# Patient Record
Sex: MALE | Race: WHITE | NOT HISPANIC OR LATINO | Employment: OTHER | URBAN - METROPOLITAN AREA
[De-identification: names, ages, dates, MRNs, and addresses within clinical notes are randomized per-mention and may not be internally consistent; named-entity substitution may affect disease eponyms.]

---

## 2021-10-18 LAB
SARS-COV-2, PCR: NOT DETECTED
SPECIMEN SOURCE: NORMAL

## 2023-01-18 PROBLEM — I35.0 AORTIC STENOSIS: Status: ACTIVE | Noted: 2023-01-18

## 2023-01-18 PROBLEM — M62.89 EXERCISE-INDUCED LEG FATIGUE: Status: ACTIVE | Noted: 2023-01-18

## 2023-01-18 PROBLEM — I65.23 STENOSIS OF BOTH EXTERNAL CAROTID ARTERIES: Status: ACTIVE | Noted: 2023-01-18

## 2023-01-18 PROBLEM — N40.0 BENIGN ENLARGEMENT OF PROSTATE: Status: ACTIVE | Noted: 2023-01-18

## 2023-01-18 PROBLEM — I10 HYPERTENSION: Status: ACTIVE | Noted: 2023-01-18

## 2023-01-18 PROBLEM — E78.5 HYPERLIPIDEMIA: Status: ACTIVE | Noted: 2023-01-18

## 2023-01-18 PROBLEM — N18.30 CHRONIC RENAL DISEASE, STAGE III (MULTI): Status: ACTIVE | Noted: 2023-01-18

## 2023-01-18 PROBLEM — M51.369 DDD (DEGENERATIVE DISC DISEASE), LUMBAR: Status: ACTIVE | Noted: 2023-01-18

## 2023-01-18 PROBLEM — M51.36 DDD (DEGENERATIVE DISC DISEASE), LUMBAR: Status: ACTIVE | Noted: 2023-01-18

## 2023-01-18 PROBLEM — R94.39 ABNORMAL NUCLEAR STRESS TEST: Status: ACTIVE | Noted: 2023-01-18

## 2023-01-18 PROBLEM — I25.10 CORONARY ARTERY DISEASE: Status: ACTIVE | Noted: 2023-01-18

## 2023-01-18 PROBLEM — M48.062 SPINAL STENOSIS, LUMBAR REGION, WITH NEUROGENIC CLAUDICATION: Status: ACTIVE | Noted: 2023-01-18

## 2023-01-18 PROBLEM — J30.9 ALLERGIC RHINITIS: Status: ACTIVE | Noted: 2023-01-18

## 2023-01-18 PROBLEM — F17.200 NICOTINE DEPENDENCE: Status: ACTIVE | Noted: 2023-01-18

## 2023-01-18 PROBLEM — M54.16 LUMBAR RADICULOPATHY: Status: ACTIVE | Noted: 2023-01-18

## 2023-01-18 PROBLEM — M47.816 DEGENERATIVE JOINT DISEASE (DJD) OF LUMBAR SPINE: Status: ACTIVE | Noted: 2023-01-18

## 2023-01-18 PROBLEM — Z95.1 S/P CABG (CORONARY ARTERY BYPASS GRAFT): Status: ACTIVE | Noted: 2023-01-18

## 2023-01-18 PROBLEM — R93.1 ABNORMAL FINDINGS ON CARDIAC CATHETERIZATION: Status: ACTIVE | Noted: 2023-01-18

## 2023-01-18 PROBLEM — F41.1 GENERALIZED ANXIETY DISORDER: Status: ACTIVE | Noted: 2023-01-18

## 2023-01-18 PROBLEM — K64.9 HEMORRHOIDS: Status: ACTIVE | Noted: 2023-01-18

## 2023-01-18 PROBLEM — R94.31 ECG ABNORMAL: Status: ACTIVE | Noted: 2023-01-18

## 2023-01-18 PROBLEM — M62.50 DENERVATION ATROPHY OF MUSCLE: Status: ACTIVE | Noted: 2023-01-18

## 2023-01-18 PROBLEM — F32.A DEPRESSION, UNSPECIFIED: Status: ACTIVE | Noted: 2023-01-18

## 2023-01-18 PROBLEM — R09.89 DECREASED PEDAL PULSES: Status: ACTIVE | Noted: 2023-01-18

## 2023-01-18 PROBLEM — R01.1 CARDIAC MURMUR: Status: ACTIVE | Noted: 2023-01-18

## 2023-01-18 PROBLEM — E55.9 VITAMIN D DEFICIENCY: Status: ACTIVE | Noted: 2023-01-18

## 2023-01-18 RX ORDER — MULTIVITAMIN
TABLET ORAL
COMMUNITY

## 2023-01-18 RX ORDER — TERAZOSIN 1 MG/1
1 CAPSULE ORAL DAILY
COMMUNITY
End: 2023-09-26 | Stop reason: SDUPTHER

## 2023-01-18 RX ORDER — ATORVASTATIN CALCIUM 40 MG/1
40 TABLET, FILM COATED ORAL DAILY
COMMUNITY
End: 2023-12-28 | Stop reason: SDUPTHER

## 2023-01-18 RX ORDER — EPINEPHRINE 0.3 MG/.3ML
1 INJECTION SUBCUTANEOUS AS NEEDED
COMMUNITY

## 2023-01-18 RX ORDER — TRAMADOL HYDROCHLORIDE 50 MG/1
50 TABLET ORAL EVERY 6 HOURS PRN
COMMUNITY
End: 2023-08-07 | Stop reason: SDUPTHER

## 2023-01-18 RX ORDER — ASPIRIN 81 MG/1
81 TABLET ORAL DAILY
COMMUNITY

## 2023-01-18 RX ORDER — FLUOXETINE HYDROCHLORIDE 20 MG/1
20 CAPSULE ORAL DAILY
COMMUNITY
End: 2023-12-28 | Stop reason: SDUPTHER

## 2023-01-18 RX ORDER — METOPROLOL TARTRATE 25 MG/1
25 TABLET, FILM COATED ORAL 2 TIMES DAILY
COMMUNITY
End: 2024-05-16 | Stop reason: SDUPTHER

## 2023-01-18 RX ORDER — MELOXICAM 7.5 MG/1
7.5 TABLET ORAL DAILY PRN
COMMUNITY
End: 2023-01-18 | Stop reason: ENTERED-IN-ERROR

## 2023-01-18 RX ORDER — CLOPIDOGREL BISULFATE 75 MG/1
75 TABLET ORAL DAILY
COMMUNITY
End: 2024-04-12 | Stop reason: SDUPTHER

## 2023-01-18 RX ORDER — MUPIROCIN 20 MG/G
OINTMENT TOPICAL
COMMUNITY
End: 2023-12-28 | Stop reason: WASHOUT

## 2023-03-03 PROBLEM — F32.A DEPRESSION: Status: ACTIVE | Noted: 2023-03-03

## 2023-07-31 ENCOUNTER — TELEPHONE (OUTPATIENT)
Dept: PRIMARY CARE | Facility: CLINIC | Age: 83
End: 2023-07-31
Payer: MEDICARE

## 2023-07-31 NOTE — TELEPHONE ENCOUNTER
Refill    Tramadol (ultram) 50 mg tablet, take 1 tablet every 6 hours prn pain    Pharm: [facility]   [phone]    LR: 12/27/22 30 days 2 refills  LV:  12/27/22  NV: 12/28/23

## 2023-08-01 ENCOUNTER — TELEPHONE (OUTPATIENT)
Dept: PRIMARY CARE | Facility: CLINIC | Age: 83
End: 2023-08-01
Payer: MEDICARE

## 2023-08-01 NOTE — TELEPHONE ENCOUNTER
REFILL REQUEST    Med: Tramadol   Med Dose: 50 mg  Med Frequency: one tab every 6 hours as needed    Pharmacy: [facility]  Pharmacy Address: [address] [place] [address] in [facility]     LR: 12/27/2022  LV: no showed C2 on 3/6/23  NV: 12/28/2023

## 2023-08-07 ENCOUNTER — OFFICE VISIT (OUTPATIENT)
Dept: PRIMARY CARE | Facility: CLINIC | Age: 83
End: 2023-08-07
Payer: MEDICARE

## 2023-08-07 VITALS
WEIGHT: 154 LBS | HEIGHT: 70 IN | SYSTOLIC BLOOD PRESSURE: 208 MMHG | BODY MASS INDEX: 22.05 KG/M2 | DIASTOLIC BLOOD PRESSURE: 91 MMHG

## 2023-08-07 DIAGNOSIS — M47.26 OSTEOARTHRITIS OF SPINE WITH RADICULOPATHY, LUMBAR REGION: Primary | ICD-10-CM

## 2023-08-07 DIAGNOSIS — Z51.81 ENCOUNTER FOR THERAPEUTIC DRUG LEVEL MONITORING: ICD-10-CM

## 2023-08-07 DIAGNOSIS — Z02.83 ENCOUNTER FOR DRUG SCREENING: ICD-10-CM

## 2023-08-07 PROCEDURE — 80365 DRUG SCREENING OXYCODONE: CPT

## 2023-08-07 PROCEDURE — 80307 DRUG TEST PRSMV CHEM ANLYZR: CPT

## 2023-08-07 PROCEDURE — 80361 OPIATES 1 OR MORE: CPT

## 2023-08-07 PROCEDURE — 3080F DIAST BP >= 90 MM HG: CPT | Performed by: FAMILY MEDICINE

## 2023-08-07 PROCEDURE — 80354 DRUG SCREENING FENTANYL: CPT

## 2023-08-07 PROCEDURE — 80373 DRUG SCREENING TRAMADOL: CPT

## 2023-08-07 PROCEDURE — 1159F MED LIST DOCD IN RCRD: CPT | Performed by: FAMILY MEDICINE

## 2023-08-07 PROCEDURE — 80358 DRUG SCREENING METHADONE: CPT

## 2023-08-07 PROCEDURE — 3077F SYST BP >= 140 MM HG: CPT | Performed by: FAMILY MEDICINE

## 2023-08-07 PROCEDURE — 80368 SEDATIVE HYPNOTICS: CPT

## 2023-08-07 PROCEDURE — 1125F AMNT PAIN NOTED PAIN PRSNT: CPT | Performed by: FAMILY MEDICINE

## 2023-08-07 PROCEDURE — 80346 BENZODIAZEPINES1-12: CPT

## 2023-08-07 PROCEDURE — 1160F RVW MEDS BY RX/DR IN RCRD: CPT | Performed by: FAMILY MEDICINE

## 2023-08-07 PROCEDURE — 99213 OFFICE O/P EST LOW 20 MIN: CPT | Performed by: FAMILY MEDICINE

## 2023-08-07 RX ORDER — TRAMADOL HYDROCHLORIDE 50 MG/1
50 TABLET ORAL EVERY 6 HOURS PRN
Qty: 60 TABLET | Refills: 2 | Status: SHIPPED | OUTPATIENT
Start: 2023-08-07 | End: 2023-12-28 | Stop reason: SDUPTHER

## 2023-08-07 NOTE — PROGRESS NOTES
"Subjective     Patient ID: [id number]     Honorio Harvey is a 82 y.o. male who presents for C2 Refill.    HPI  Patient is using 1-2 Ultram daily for chronic low back pain.  Occasionally substitutes or adds aspirin for pain.  He remains functional and lives independently with his wife.  His last dose of tramadol was yesterday.    Objective   BP (!) 208/91   Ht 1.778 m (5' 10\")   Wt 69.9 kg (154 lb)   BMI 22.10 kg/m²    Physical Exam: EXAM:  Alert and oriented ×3.  No acute distress.  No tremors noted.  Gait is normal.  Mood and affect are normal.     Assessment/Plan   1. Osteoarthritis of spine with radiculopathy, lumbar region  - traMADol (Ultram) 50 mg tablet; Take 1 tablet (50 mg) by mouth every 6 hours if needed for severe pain (7 - 10) (As Needed for Pain).  Dispense: 60 tablet; Refill: 2    2. Encounter for drug screening  - Opiate/Opioid/Benzo Extended Prescription Compliance    3. Encounter for therapeutic drug level monitoring  - Opiate/Opioid/Benzo Extended Prescription Compliance      Follow up in 5 months as scheduled for medical management    I will continue to monitor, evaluate, assess and treat all problems/diagnoses as appropriate and continue to collaborate with specialists.    Contact office or send a  MY Chart message with any questions or concerns    Encouraged to sign up with my  My Chart  Patient will only be notified of labs that require medical intervention.    Prescriptions will not be filled unless you are compliant with your follow up appointments or have a follow up appointment scheduled as per instruction of your physician. Refills should be requested at the time of your visit.    **Charting was completed using voice recognition technology and may include unintended errors**    Bassem Amador DO, [facility]  [address] [facility]  [place] [address] [phone]    Problem List Items Addressed This Visit    None  Visit Diagnoses       Encounter for drug screening        Relevant Orders "    Opiate/Opioid/Benzo Extended Prescription Compliance    Encounter for therapeutic drug level monitoring        Relevant Orders    Opiate/Opioid/Benzo Extended Prescription Compliance            Bassem Amador, DO

## 2023-08-10 LAB
6-ACETYLMORPHINE: <25 NG/ML
7-AMINOCLONAZEPAM: <25 NG/ML
ALPHA-HYDROXYALPRAZOLAM: <25 NG/ML
ALPHA-HYDROXYMIDAZOLAM: <25 NG/ML
ALPRAZOLAM: <25 NG/ML
AMPHETAMINE (PRESENCE) IN URINE BY SCREEN METHOD: ABNORMAL
BARBITURATES PRESENCE IN URINE BY SCREEN METHOD: ABNORMAL
CANNABINOIDS IN URINE BY SCREEN METHOD: ABNORMAL
CHLORDIAZEPOXIDE: <25 NG/ML
CLONAZEPAM: <25 NG/ML
COCAINE (PRESENCE) IN URINE BY SCREEN METHOD: ABNORMAL
CODEINE: <50 NG/ML
CREATINE, URINE FOR DRUG: 226.6 MG/DL
DIAZEPAM: <25 NG/ML
DRUG SCREEN COMMENT URINE: ABNORMAL
EDDP: <25 NG/ML
FENTANYL CONFIRMATION, URINE: <2.5 NG/ML
HYDROCODONE: <25 NG/ML
HYDROMORPHONE: <25 NG/ML
LORAZEPAM: <25 NG/ML
METHADONE CONFIRMATION,URINE: <25 NG/ML
MIDAZOLAM: <25 NG/ML
MORPHINE URINE: <50 NG/ML
NORDIAZEPAM: <25 NG/ML
NORFENTANYL: <2.5 NG/ML
NORHYDROCODONE: <25 NG/ML
NOROXYCODONE: <25 NG/ML
O-DESMETHYLTRAMADOL: 375 NG/ML
OXAZEPAM: <25 NG/ML
OXYCODONE: <25 NG/ML
OXYMORPHONE: <25 NG/ML
PHENCYCLIDINE (PRESENCE) IN URINE BY SCREEN METHOD: ABNORMAL
TEMAZEPAM: <25 NG/ML
TRAMADOL: >1000 NG/ML
ZOLPIDEM METABOLITE (ZCA): <25 NG/ML
ZOLPIDEM: <25 NG/ML

## 2023-09-07 PROBLEM — Z63.6 CAREGIVER STRESS: Status: ACTIVE | Noted: 2023-09-07

## 2023-09-07 PROBLEM — R89.9 ABNORMAL LABORATORY TEST RESULT: Status: ACTIVE | Noted: 2022-03-22

## 2023-09-07 PROBLEM — I73.9 CLAUDICATION OF LOWER EXTREMITY (CMS-HCC): Status: ACTIVE | Noted: 2023-09-07

## 2023-09-07 PROBLEM — Z79.899 LONG TERM USE OF DRUG: Status: ACTIVE | Noted: 2023-09-07

## 2023-09-07 RX ORDER — LOSARTAN POTASSIUM 100 MG/1
100 TABLET ORAL DAILY
COMMUNITY
Start: 2019-09-09 | End: 2023-12-28 | Stop reason: WASHOUT

## 2023-09-07 RX ORDER — HYDROCHLOROTHIAZIDE 25 MG/1
25 TABLET ORAL DAILY
COMMUNITY

## 2023-09-07 RX ORDER — DAPAGLIFLOZIN 10 MG/1
1 TABLET, FILM COATED ORAL DAILY
COMMUNITY
Start: 2023-01-18 | End: 2024-05-16 | Stop reason: SDUPTHER

## 2023-09-07 RX ORDER — DEXAMETHASONE SODIUM PHOSPHATE 10 MG/ML
10 INJECTION INTRAMUSCULAR; INTRAVENOUS
COMMUNITY
Start: 2020-12-03 | End: 2023-12-28 | Stop reason: WASHOUT

## 2023-09-07 RX ORDER — LOSARTAN POTASSIUM 100 MG/1
0.5 TABLET ORAL 2 TIMES DAILY
COMMUNITY
Start: 2018-05-31 | End: 2024-05-16 | Stop reason: SDUPTHER

## 2023-09-07 RX ORDER — ACETAMINOPHEN 325 MG/1
2 TABLET ORAL EVERY 4 HOURS PRN
COMMUNITY
Start: 2021-12-07

## 2023-09-07 RX ORDER — MELOXICAM 7.5 MG/1
1 TABLET ORAL AS NEEDED
COMMUNITY
Start: 2022-03-08 | End: 2023-12-28 | Stop reason: WASHOUT

## 2023-09-07 RX ORDER — OXYCODONE HYDROCHLORIDE 5 MG/1
1 TABLET ORAL EVERY 6 HOURS PRN
COMMUNITY
Start: 2022-04-07 | End: 2023-12-28 | Stop reason: WASHOUT

## 2023-09-07 RX ORDER — OXYCODONE AND ACETAMINOPHEN 5; 325 MG/1; MG/1
1 TABLET ORAL 3 TIMES DAILY PRN
COMMUNITY
Start: 2022-09-09 | End: 2023-12-28 | Stop reason: WASHOUT

## 2023-09-07 RX ORDER — GABAPENTIN 300 MG/1
1 CAPSULE ORAL 2 TIMES DAILY
COMMUNITY
Start: 2019-09-20 | End: 2023-12-28 | Stop reason: WASHOUT

## 2023-09-07 RX ORDER — ATENOLOL 25 MG/1
25 TABLET ORAL DAILY
COMMUNITY
Start: 2019-09-15 | End: 2024-05-16 | Stop reason: ALTCHOICE

## 2023-09-16 DIAGNOSIS — I10 ESSENTIAL (PRIMARY) HYPERTENSION: ICD-10-CM

## 2023-09-18 RX ORDER — TERAZOSIN 1 MG/1
1 CAPSULE ORAL DAILY
Qty: 90 CAPSULE | Refills: 2 | OUTPATIENT
Start: 2023-09-18

## 2023-09-23 DIAGNOSIS — I10 ESSENTIAL (PRIMARY) HYPERTENSION: ICD-10-CM

## 2023-09-25 ENCOUNTER — TELEPHONE (OUTPATIENT)
Dept: PRIMARY CARE | Facility: CLINIC | Age: 83
End: 2023-09-25
Payer: MEDICARE

## 2023-09-25 DIAGNOSIS — I10 HYPERTENSION, UNSPECIFIED TYPE: ICD-10-CM

## 2023-09-25 RX ORDER — TERAZOSIN 1 MG/1
1 CAPSULE ORAL DAILY
Qty: 90 CAPSULE | Refills: 2 | OUTPATIENT
Start: 2023-09-25

## 2023-09-25 NOTE — TELEPHONE ENCOUNTER
REFILL REQUEST    Med: Terazosin   Med Dose: 1 mg  Med Frequency: one cap daily    Pharmacy:   Pharmacy Address: [address] [place] [address] in [facility]    LR: 12/27/2023  LV: 08/07/2023  NV: 12/28/2023

## 2023-09-26 DIAGNOSIS — I10 ESSENTIAL (PRIMARY) HYPERTENSION: ICD-10-CM

## 2023-09-26 RX ORDER — TERAZOSIN 1 MG/1
1 CAPSULE ORAL DAILY
Qty: 30 CAPSULE | Refills: 0 | Status: SHIPPED | OUTPATIENT
Start: 2023-09-26 | End: 2023-11-06 | Stop reason: SDUPTHER

## 2023-09-28 RX ORDER — TERAZOSIN 1 MG/1
1 CAPSULE ORAL DAILY
Qty: 90 CAPSULE | Refills: 2 | OUTPATIENT
Start: 2023-09-28

## 2023-10-09 ENCOUNTER — APPOINTMENT (OUTPATIENT)
Dept: RADIOLOGY | Facility: HOSPITAL | Age: 83
End: 2023-10-09
Payer: MEDICARE

## 2023-10-11 ENCOUNTER — TELEPHONE (OUTPATIENT)
Dept: VASCULAR SURGERY | Facility: CLINIC | Age: 83
End: 2023-10-11
Payer: MEDICARE

## 2023-10-11 NOTE — TELEPHONE ENCOUNTER
I have attempted to contact   oKry Honorio Harvey    . There is no answer at the following phone number [phone]  .   I am unable to leave a voice mail message  Nereyda Nichlos RN BSN

## 2023-10-12 ENCOUNTER — APPOINTMENT (OUTPATIENT)
Dept: VASCULAR SURGERY | Facility: CLINIC | Age: 83
End: 2023-10-12
Payer: MEDICARE

## 2023-10-17 ENCOUNTER — HOSPITAL ENCOUNTER (OUTPATIENT)
Dept: RADIOLOGY | Facility: HOSPITAL | Age: 83
Discharge: HOME | End: 2023-10-17
Payer: MEDICARE

## 2023-10-17 DIAGNOSIS — I70.211 ATHEROSCLEROSIS OF NATIVE ARTERIES OF EXTREMITIES WITH INTERMITTENT CLAUDICATION, RIGHT LEG (CMS-HCC): ICD-10-CM

## 2023-10-17 DIAGNOSIS — I70.212 ATHEROSCLEROSIS OF NATIVE ARTERIES OF EXTREMITIES WITH INTERMITTENT CLAUDICATION, LEFT LEG (CMS-HCC): ICD-10-CM

## 2023-10-17 DIAGNOSIS — I73.9 PERIPHERAL VASCULAR DISEASE, UNSPECIFIED (CMS-HCC): ICD-10-CM

## 2023-10-17 PROCEDURE — 93923 UPR/LXTR ART STDY 3+ LVLS: CPT | Performed by: INTERNAL MEDICINE

## 2023-10-17 PROCEDURE — 93923 UPR/LXTR ART STDY 3+ LVLS: CPT

## 2023-10-25 ENCOUNTER — TELEPHONE (OUTPATIENT)
Dept: VASCULAR SURGERY | Facility: CLINIC | Age: 83
End: 2023-10-25
Payer: MEDICARE

## 2023-10-25 NOTE — TELEPHONE ENCOUNTER
I have attempted to contact    Mr. Harvey  . There is no answer at the following phone number   [phone]   . I have left a voice mail message for the patient to contact Dr. Bass's office nurse at [phone] .   I am trying to confirm the patient has completed AUGUSTINE studies  PRIOR to his planned office visit.   Nereyda Nichols RN BSN

## 2023-11-02 ENCOUNTER — OFFICE VISIT (OUTPATIENT)
Dept: VASCULAR SURGERY | Facility: CLINIC | Age: 83
End: 2023-11-02
Payer: MEDICARE

## 2023-11-02 ENCOUNTER — TRANSCRIBE ORDERS (OUTPATIENT)
Dept: VASCULAR SURGERY | Facility: CLINIC | Age: 83
End: 2023-11-02

## 2023-11-02 VITALS
OXYGEN SATURATION: 99 % | DIASTOLIC BLOOD PRESSURE: 74 MMHG | RESPIRATION RATE: 16 BRPM | HEART RATE: 64 BPM | WEIGHT: 159 LBS | HEIGHT: 70 IN | TEMPERATURE: 96.9 F | BODY MASS INDEX: 22.76 KG/M2 | SYSTOLIC BLOOD PRESSURE: 119 MMHG

## 2023-11-02 DIAGNOSIS — I73.9 PAD (PERIPHERAL ARTERY DISEASE) (CMS-HCC): Primary | ICD-10-CM

## 2023-11-02 DIAGNOSIS — I73.9 PAD (PERIPHERAL ARTERY DISEASE) (CMS-HCC): ICD-10-CM

## 2023-11-02 PROCEDURE — 3074F SYST BP LT 130 MM HG: CPT | Performed by: SURGERY

## 2023-11-02 PROCEDURE — 99214 OFFICE O/P EST MOD 30 MIN: CPT | Performed by: SURGERY

## 2023-11-02 PROCEDURE — 3078F DIAST BP <80 MM HG: CPT | Performed by: SURGERY

## 2023-11-02 PROCEDURE — 1160F RVW MEDS BY RX/DR IN RCRD: CPT | Performed by: SURGERY

## 2023-11-02 PROCEDURE — 1125F AMNT PAIN NOTED PAIN PRSNT: CPT | Performed by: SURGERY

## 2023-11-02 PROCEDURE — 1159F MED LIST DOCD IN RCRD: CPT | Performed by: SURGERY

## 2023-11-02 NOTE — PROGRESS NOTES
Vascular Surgery Clinic Note    CC: PAD    HPI:  Honorio Harvey is 83 y.o. male with history of PAD and claudication. Still smoking. Claudication improved with home treadmill exercises. He feels well. He is getting ready for pheasant hunting this fall with a new hunting dog. No rest pain.     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    Meds:   Current Outpatient Medications on File Prior to Visit   Medication Sig Dispense Refill    acetaminophen (Tylenol) 325 mg tablet Take 2 tablets (650 mg) by mouth every 4 hours if needed for mild pain (1 - 3) or fever (temp greater than 38.0 C).      aspirin 81 mg EC tablet Take 1 tablet (81 mg) by mouth once daily. As Directed      atenolol (Tenormin) 25 mg tablet Take 1 tablet (25 mg) by mouth once daily.      atorvastatin (Lipitor) 40 mg tablet Take 1 tablet (40 mg) by mouth once daily. As Directed      clopidogrel (Plavix) 75 mg tablet Take 1 tablet (75 mg) by mouth once daily.      dapagliflozin propanediol (Farxiga) 10 mg Take 1 tablet (10 mg) by mouth once daily.      dexAMETHasone, PF, 10 mg/mL injection 1 mL (10 mg).      EPINEPHrine 0.3 mg/0.3 mL injection syringe Inject 0.3 mL (0.3 mg) as directed if needed for anaphylaxis (Inject 0.3ml Intramusculary as Directed). Inject into upper leg. Call 911 after  use.      FLUoxetine (PROzac) 20 mg capsule Take 1 capsule (20 mg) by mouth once daily.      gabapentin (Neurontin) 300 mg capsule Take 1 capsule (300 mg) by mouth 2 times a day.      hydroCHLOROthiazide (HYDRODiuril) 25 mg tablet Take 1 tablet (25 mg) by mouth once daily.      lidocaine HCL 4 % adhesive patch,medicated Apply 1-2 patches topically 1 (one) time each day. Apply 1-2 patches to lower back and leave in place for 12 hours then remove for 12 hours      losartan (Cozaar) 100 mg tablet twice a day.  Take by mouth twice daily. 1/2 tab 2 times a day      losartan (Cozaar) 100 mg tablet Take 1 tablet (100 mg) by mouth once daily.      meloxicam (Mobic) 7.5 mg tablet Take 1 tablet (7.5 mg) by mouth if needed (pain).      metoprolol tartrate (Lopressor) 25 mg tablet Take 1 tablet (25 mg) by mouth 2 times a day.      multivitamin tablet Take by mouth.      mupirocin (Bactroban) 2 % ointment Apply topically. Apply to the Affected Area(s) twice daily starting 5 (FIVE) days Prior to surgery      oxyCODONE (Roxicodone) 5 mg immediate release tablet Take 1 tablet (5 mg) by mouth every 6 hours if needed (pain).      oxyCODONE-acetaminophen (Percocet) 5-325 mg tablet Take 1 tablet by mouth 3 times a day as needed (pain).      traMADol (Ultram) 50 mg tablet Take 1 tablet (50 mg) by mouth every 6 hours if needed for severe pain (7 - 10) (As Needed for Pain). 60 tablet 2    terazosin (Hytrin) 1 mg capsule Take 1 capsule (1 mg) by mouth once daily. 30 capsule 0     No current facility-administered medications on file prior to visit.        Allergies:   Allergies   Allergen Reactions    Bee Venom Protein (Honey Bee) Unknown       SH:    Social Determinants of Health     Tobacco Use: High Risk (11/2/2023)    Patient History     Smoking Tobacco Use: Every Day     Smokeless Tobacco Use: Never     Passive Exposure: Not on file   Alcohol Use: Not on file   Financial Resource Strain: Not on file   Food Insecurity: Not on file    Transportation Needs: Not on file   Physical Activity: Not on file   Stress: Not on file   Social Connections: Not on file   Intimate Partner Violence: Not on file   Depression: Not on file   Housing Stability: Not on file   Utilities: Not on file   Digital Equity: Not on file        FH:  Family History   Problem Relation Name Age of Onset    Other (malignant neoplasm) Mother      Other (cerebral infarction) Father      Emphysema Father      Hypertension Father          ROS:  All systems were reviewed and are negative except as per HPI.    Objective:  Vitals:  Vitals:    11/02/23 1107   BP: 119/74   Pulse: 64   Resp: 16   Temp: 36.1 °C (96.9 °F)   SpO2: 99%        Exam:  In NAD, well appearing  Abd Soft, ND/NT  Vascular examination:  Feet are warm, no wounds.    Assessment & Plan:  Honorio Harvey is 83 y.o. male with stable claudication. Rtc yearly with AUGUSTINE.      I spent a total of 30 minutes on the day of the visit.         Jeffery Bass M.D.

## 2023-11-06 ENCOUNTER — TELEPHONE (OUTPATIENT)
Dept: PRIMARY CARE | Facility: CLINIC | Age: 83
End: 2023-11-06
Payer: MEDICARE

## 2023-11-06 DIAGNOSIS — I10 HYPERTENSION, UNSPECIFIED TYPE: ICD-10-CM

## 2023-11-06 RX ORDER — TERAZOSIN 1 MG/1
1 CAPSULE ORAL DAILY
Qty: 90 CAPSULE | Refills: 0 | Status: SHIPPED | OUTPATIENT
Start: 2023-11-06 | End: 2023-12-28 | Stop reason: SDUPTHER

## 2023-11-06 NOTE — TELEPHONE ENCOUNTER
REFILL REQUEST    Med: Terazosin   Med Dose: 1 mg  Med Frequency: one cap daily    Pharmacy:   Pharmacy Address: [address] [place] [address] in [facility]    LR: 09/26/2023  LV: 08/07/2023  NV: 12/28/2023

## 2023-12-28 ENCOUNTER — OFFICE VISIT (OUTPATIENT)
Dept: PRIMARY CARE | Facility: CLINIC | Age: 83
End: 2023-12-28
Payer: MEDICARE

## 2023-12-28 VITALS
OXYGEN SATURATION: 96 % | WEIGHT: 151 LBS | BODY MASS INDEX: 22.36 KG/M2 | DIASTOLIC BLOOD PRESSURE: 74 MMHG | SYSTOLIC BLOOD PRESSURE: 110 MMHG | HEIGHT: 69 IN | HEART RATE: 62 BPM

## 2023-12-28 DIAGNOSIS — Z00.00 MEDICARE ANNUAL WELLNESS VISIT, SUBSEQUENT: ICD-10-CM

## 2023-12-28 DIAGNOSIS — Z00.00 ROUTINE GENERAL MEDICAL EXAMINATION AT HEALTH CARE FACILITY: Primary | ICD-10-CM

## 2023-12-28 DIAGNOSIS — E55.9 VITAMIN D DEFICIENCY: ICD-10-CM

## 2023-12-28 DIAGNOSIS — M47.26 OSTEOARTHRITIS OF SPINE WITH RADICULOPATHY, LUMBAR REGION: ICD-10-CM

## 2023-12-28 DIAGNOSIS — I10 HYPERTENSION, UNSPECIFIED TYPE: ICD-10-CM

## 2023-12-28 DIAGNOSIS — S60.222A CONTUSION OF LEFT HAND, INITIAL ENCOUNTER: ICD-10-CM

## 2023-12-28 DIAGNOSIS — F41.9 ANXIETY: ICD-10-CM

## 2023-12-28 DIAGNOSIS — E78.5 HYPERLIPIDEMIA, UNSPECIFIED HYPERLIPIDEMIA TYPE: ICD-10-CM

## 2023-12-28 DIAGNOSIS — N40.0 BENIGN PROSTATIC HYPERPLASIA, UNSPECIFIED WHETHER LOWER URINARY TRACT SYMPTOMS PRESENT: ICD-10-CM

## 2023-12-28 DIAGNOSIS — E29.1 HYPOGONADISM MALE: ICD-10-CM

## 2023-12-28 DIAGNOSIS — S61.215A LACERATION OF LEFT RING FINGER WITHOUT FOREIGN BODY WITHOUT DAMAGE TO NAIL, INITIAL ENCOUNTER: ICD-10-CM

## 2023-12-28 PROCEDURE — 3074F SYST BP LT 130 MM HG: CPT | Performed by: FAMILY MEDICINE

## 2023-12-28 PROCEDURE — 1170F FXNL STATUS ASSESSED: CPT | Performed by: FAMILY MEDICINE

## 2023-12-28 PROCEDURE — 1125F AMNT PAIN NOTED PAIN PRSNT: CPT | Performed by: FAMILY MEDICINE

## 2023-12-28 PROCEDURE — 1160F RVW MEDS BY RX/DR IN RCRD: CPT | Performed by: FAMILY MEDICINE

## 2023-12-28 PROCEDURE — 1159F MED LIST DOCD IN RCRD: CPT | Performed by: FAMILY MEDICINE

## 2023-12-28 PROCEDURE — 99213 OFFICE O/P EST LOW 20 MIN: CPT | Performed by: FAMILY MEDICINE

## 2023-12-28 PROCEDURE — 3078F DIAST BP <80 MM HG: CPT | Performed by: FAMILY MEDICINE

## 2023-12-28 PROCEDURE — 99397 PER PM REEVAL EST PAT 65+ YR: CPT | Performed by: FAMILY MEDICINE

## 2023-12-28 RX ORDER — FLUOXETINE HYDROCHLORIDE 20 MG/1
20 CAPSULE ORAL DAILY
Qty: 90 CAPSULE | Refills: 2 | Status: SHIPPED | OUTPATIENT
Start: 2023-12-28 | End: 2024-05-16 | Stop reason: SDUPTHER

## 2023-12-28 RX ORDER — ATORVASTATIN CALCIUM 40 MG/1
40 TABLET, FILM COATED ORAL DAILY
Qty: 90 TABLET | Refills: 2 | Status: SHIPPED | OUTPATIENT
Start: 2023-12-28 | End: 2024-05-16 | Stop reason: SDUPTHER

## 2023-12-28 RX ORDER — TRAMADOL HYDROCHLORIDE 50 MG/1
50 TABLET ORAL EVERY 6 HOURS PRN
Qty: 60 TABLET | Refills: 2 | Status: SHIPPED | OUTPATIENT
Start: 2023-12-28 | End: 2024-05-16 | Stop reason: SDUPTHER

## 2023-12-28 RX ORDER — TERAZOSIN 1 MG/1
1 CAPSULE ORAL DAILY
Qty: 90 CAPSULE | Refills: 0 | Status: SHIPPED | OUTPATIENT
Start: 2023-12-28 | End: 2024-04-12 | Stop reason: SDUPTHER

## 2023-12-28 ASSESSMENT — ENCOUNTER SYMPTOMS
OCCASIONAL FEELINGS OF UNSTEADINESS: 0
LOSS OF SENSATION IN FEET: 0
DEPRESSION: 0

## 2023-12-28 ASSESSMENT — ACTIVITIES OF DAILY LIVING (ADL)
DRESSING: INDEPENDENT
TAKING_MEDICATION: INDEPENDENT
BATHING: INDEPENDENT
GROCERY_SHOPPING: INDEPENDENT
DOING_HOUSEWORK: INDEPENDENT
MANAGING_FINANCES: INDEPENDENT

## 2023-12-28 ASSESSMENT — PATIENT HEALTH QUESTIONNAIRE - PHQ9
2. FEELING DOWN, DEPRESSED OR HOPELESS: NOT AT ALL
1. LITTLE INTEREST OR PLEASURE IN DOING THINGS: NOT AT ALL
SUM OF ALL RESPONSES TO PHQ9 QUESTIONS 1 & 2: 0

## 2023-12-28 NOTE — PROGRESS NOTES
Annual Comprehensive Medical Exam    83 y.o. male presents for annual comprehensive medical evaluation and preventive services screening.  No recent hospitalizations, surgeries or significant injuries.    HPI    12/24/23 walking dog, pulled over by dog.  Sustained left 4th finger gouge and contusion of hand.  Did not go to ER.  Can move everything.  No significant pain, but wound is significant.  Tx with antibiotic cream to wound;  Voltaren gel to hand.    DJD spine - usually takes 1-2 ultram per day.  Last filled 11/7/23 for 60 pills.  Last dose this AM.  Washing dishes or running vacuum worsen lower back pain    Peripheral artery disease with claudication symptoms in the left leg.  Treatment by vascular surgeon, Dr. Bass.  AUGUSTINE done twice in 2023.  Treatment has been progressive exercise endurance.  Patient states he is now able to walk longer distances.  The patient loves to hunt and wants to be able to walk longer distances.    Coronary artery disease managed by Dr. Subramanian, cardiologist.  Using Farxiga both for CAD and CKD stage IIIa.    Medication list reviewed with patient.  Multiple medications are no longer being taken are taken differently.    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 condition    Occlusion and stenosis of bilateral carotid arteries 09/26/2016    Asymptomatic bilateral carotid artery stenosis    Occlusion and stenosis of left carotid artery 10/19/2020    Occlusion of carotid artery without cerebral infarction, left    Other conditions influencing health status 10/03/2016    Normal cardiac stress test    Other general  symptoms and signs 12/23/2016    Excessive oral secretions    Parageusia 04/19/2020    Ageusia    Personal history of other mental and behavioral disorders 09/11/2019    History of anxiety    Personal history of other specified conditions 12/23/2016    History of dysphagia    Unspecified macular degeneration 10/03/2016    Macular degeneration      Past Surgical History:   Procedure Laterality Date    CAROTID ENDARTERECTOMY  04/19/2020    Carotid Thromboendarterectomy    OTHER SURGICAL HISTORY  12/27/2022    Lumbar laminectomy    TONSILLECTOMY  10/03/2016    Tonsillectomy     Family History   Problem Relation Name Age of Onset    Other (malignant neoplasm) Mother      Other (cerebral infarction) Father      Emphysema Father      Hypertension Father        Social History     Socioeconomic History    Marital status:      Spouse name: Not on file    Number of children: Not on file    Years of education: Not on file    Highest education level: Not on file   Occupational History    Not on file   Tobacco Use    Smoking status: Every Day     Packs/day: .5     Types: Cigarettes    Smokeless tobacco: Never   Substance and Sexual Activity    Alcohol use: Not Currently    Drug use: Never    Sexual activity: Not on file   Other Topics Concern    Not on file   Social History Narrative    Not on file     Social Determinants of Health     Financial Resource Strain: Not on file   Food Insecurity: Not on file   Transportation Needs: Not on file   Physical Activity: Not on file   Stress: Not on file   Social Connections: Not on file   Intimate Partner Violence: Not on file   Housing Stability: Not on file       Current Outpatient Medications on File Prior to Visit   Medication Sig Dispense Refill    aspirin 81 mg EC tablet Take 1 tablet (81 mg) by mouth once daily. As Directed      atorvastatin (Lipitor) 40 mg tablet Take 1 tablet (40 mg) by mouth once daily. As Directed      clopidogrel (Plavix) 75 mg tablet Take 1 tablet (75  mg) by mouth once daily.      EPINEPHrine 0.3 mg/0.3 mL injection syringe Inject 0.3 mL (0.3 mg) as directed if needed for anaphylaxis (Inject 0.3ml Intramusculary as Directed). Inject into upper leg. Call 911 after use.      FLUoxetine (PROzac) 20 mg capsule Take 1 capsule (20 mg) by mouth once daily.      losartan (Cozaar) 100 mg tablet Take 0.5 tablets (50 mg) by mouth twice a day.  Take by mouth twice daily. 1/2 tab 2 times a day      metoprolol tartrate (Lopressor) 25 mg tablet Take 1 tablet (25 mg) by mouth 2 times a day.      multivitamin tablet Take by mouth.      terazosin (Hytrin) 1 mg capsule Take 1 capsule (1 mg) by mouth once daily. 90 capsule 0    traMADol (Ultram) 50 mg tablet Take 1 tablet (50 mg) by mouth every 6 hours if needed for severe pain (7 - 10) (As Needed for Pain). 60 tablet 2    acetaminophen (Tylenol) 325 mg tablet Take 2 tablets (650 mg) by mouth every 4 hours if needed for mild pain (1 - 3) or fever (temp greater than 38.0 C).      atenolol (Tenormin) 25 mg tablet Take 1 tablet (25 mg) by mouth once daily.      dapagliflozin propanediol (Farxiga) 10 mg Take 1 tablet (10 mg) by mouth once daily.      dexAMETHasone, PF, 10 mg/mL injection 1 mL (10 mg).      gabapentin (Neurontin) 300 mg capsule Take 1 capsule (300 mg) by mouth 2 times a day.      hydroCHLOROthiazide (HYDRODiuril) 25 mg tablet Take 1 tablet (25 mg) by mouth once daily.      lidocaine HCL 4 % adhesive patch,medicated Apply 1-2 patches topically 1 (one) time each day. Apply 1-2 patches to lower back and leave in place for 12 hours then remove for 12 hours      losartan (Cozaar) 100 mg tablet Take 1 tablet (100 mg) by mouth once daily.      meloxicam (Mobic) 7.5 mg tablet Take 1 tablet (7.5 mg) by mouth if needed (pain).      mupirocin (Bactroban) 2 % ointment Apply topically. Apply to the Affected Area(s) twice daily starting 5 (FIVE) days Prior to surgery      oxyCODONE (Roxicodone) 5 mg immediate release tablet Take 1  "tablet (5 mg) by mouth every 6 hours if needed (pain).      oxyCODONE-acetaminophen (Percocet) 5-325 mg tablet Take 1 tablet by mouth 3 times a day as needed (pain).       No current facility-administered medications on file prior to visit.       Allergies   Allergen Reactions    Bee Venom Protein (Honey Bee) Unknown         Review of Systems:  Complete review of systems is negative today except for that mentioned in the history of present illness.  In particular patient denies chest pain, shortness of breath, headaches and GI disturbances.      Visit Vitals  /74   Pulse 62   Ht 1.753 m (5' 9\")   Wt 68.5 kg (151 lb)   SpO2 96%   BMI 22.30 kg/m²   Smoking Status Every Day   BSA 1.83 m²      Physical Exam  Gen: Alert and oriented ×3 male in no acute distress.  HEENT: Head is normocephalic.  Extraocular muscles are intact.  Tympanic membranes are clear.  Pharynx is clear.  Neck is supple without adenopathy or carotid bruits.  No masses or thyromegaly  Heart: Regular rate and rhythm without murmurs.  Lungs: Clear to auscultation bilaterally.  Abdomen: Soft with normal bowel sounds.  No masses or pain to palpation.  No bruits auscultated.  Extremities: Good range of motion of all joints.  No significant edema. Pedal pulses +1-2/4  Neuro: No signs of focal neurologic deficit.  No tremor.  Speech and hearing are normal.  DTRs +3/4;  Muscle Strength +5/5.  Musculoskeletal: Spine with good ROM.  No scoliosis.  Leg lengths are equal.  Skin: No significant or irregular nevi visualized.  Psych: normal affect.  No suicidal ideation.  Good judgement and insight.       DIAGNOSIS/PLAN    1. Routine general medical examination at health care facility    2. Medicare annual wellness visit, subsequent  - Comprehensive Metabolic Panel; Future  - CBC; Future  - Lipid Panel; Future  - TSH with reflex to Free T4 if abnormal; Future  - Urinalysis with Reflex Microscopic; Future    3. Laceration of left ring finger without foreign body " without damage to nail, initial encounter    4. Contusion of left hand, initial encounter    5. Anxiety  - FLUoxetine (PROzac) 20 mg capsule; Take 1 capsule (20 mg) by mouth once daily.  Dispense: 90 capsule; Refill: 2    6. Hypertension, unspecified type  - Comprehensive Metabolic Panel; Future  - CBC; Future  - TSH with reflex to Free T4 if abnormal; Future  - Urinalysis with Reflex Microscopic; Future  - terazosin (Hytrin) 1 mg capsule; Take 1 capsule (1 mg) by mouth once daily.  Dispense: 90 capsule; Refill: 0    7. Hyperlipidemia, unspecified hyperlipidemia type  - CBC; Future  - Lipid Panel; Future  - TSH with reflex to Free T4 if abnormal; Future  - atorvastatin (Lipitor) 40 mg tablet; Take 1 tablet (40 mg) by mouth once daily. As Directed  Dispense: 90 tablet; Refill: 2    8. Osteoarthritis of spine with radiculopathy, lumbar region  - traMADol (Ultram) 50 mg tablet; Take 1 tablet (50 mg) by mouth every 6 hours if needed for severe pain (7 - 10) (As Needed for Pain).  Dispense: 60 tablet; Refill: 2    9. Vitamin D deficiency  - Vitamin D 25-Hydroxy,Total (for eval of Vitamin D levels); Future    10. Hypogonadism male  - Testosterone; Future    11. Benign prostatic hyperplasia, unspecified whether lower urinary tract symptoms present  - Prostate Specific Antigen; Future        Follow up in 6 months for medical management    I will continue to monitor, evaluate, assess and treat all problems/diagnoses as appropriate and continue to collaborate with specialists.    Contact office or send a  MY Chart message with any questions or concerns    Encouraged to sign up with my  My Chart  Patient will only be notified of labs that require medical intervention.    Prescriptions will not be filled unless you are compliant with your follow up appointments or have a follow up appointment scheduled as per instruction of your physician. Refills should be requested at the time of your visit.    **Charting was completed  using voice recognition technology and may include unintended errors**    Bassem Amador DO, [facility]  [address] [facility]  [place] [id number] [phone]  Bassem Amador DO, FACOFP

## 2024-02-16 ENCOUNTER — LAB (OUTPATIENT)
Dept: LAB | Facility: LAB | Age: 84
End: 2024-02-16
Payer: MEDICARE

## 2024-02-16 DIAGNOSIS — I10 HYPERTENSION, UNSPECIFIED TYPE: ICD-10-CM

## 2024-02-16 DIAGNOSIS — Z00.00 MEDICARE ANNUAL WELLNESS VISIT, SUBSEQUENT: ICD-10-CM

## 2024-02-16 DIAGNOSIS — E29.1 HYPOGONADISM MALE: ICD-10-CM

## 2024-02-16 DIAGNOSIS — E78.5 HYPERLIPIDEMIA, UNSPECIFIED HYPERLIPIDEMIA TYPE: ICD-10-CM

## 2024-02-16 DIAGNOSIS — N40.0 BENIGN PROSTATIC HYPERPLASIA, UNSPECIFIED WHETHER LOWER URINARY TRACT SYMPTOMS PRESENT: ICD-10-CM

## 2024-02-16 DIAGNOSIS — E55.9 VITAMIN D DEFICIENCY: ICD-10-CM

## 2024-02-16 LAB
ALBUMIN SERPL BCP-MCNC: 4.3 G/DL (ref 3.4–5)
ALP SERPL-CCNC: 69 U/L (ref 33–136)
ALT SERPL W P-5'-P-CCNC: 10 U/L (ref 10–52)
ANION GAP SERPL CALC-SCNC: 12 MMOL/L (ref 10–20)
APPEARANCE UR: CLEAR
AST SERPL W P-5'-P-CCNC: 15 U/L (ref 9–39)
BILIRUB SERPL-MCNC: 0.6 MG/DL (ref 0–1.2)
BILIRUB UR STRIP.AUTO-MCNC: NEGATIVE MG/DL
BUN SERPL-MCNC: 26 MG/DL (ref 6–23)
CALCIUM SERPL-MCNC: 9.5 MG/DL (ref 8.6–10.3)
CHLORIDE SERPL-SCNC: 106 MMOL/L (ref 98–107)
CHOLEST SERPL-MCNC: 208 MG/DL (ref 0–199)
CHOLESTEROL/HDL RATIO: 2.6
CO2 SERPL-SCNC: 28 MMOL/L (ref 21–32)
COLOR UR: YELLOW
CREAT SERPL-MCNC: 1.24 MG/DL (ref 0.5–1.3)
EGFRCR SERPLBLD CKD-EPI 2021: 57 ML/MIN/1.73M*2
ERYTHROCYTE [DISTWIDTH] IN BLOOD BY AUTOMATED COUNT: 14.6 % (ref 11.5–14.5)
GLUCOSE SERPL-MCNC: 93 MG/DL (ref 74–99)
GLUCOSE UR STRIP.AUTO-MCNC: NEGATIVE MG/DL
HCT VFR BLD AUTO: 44.3 % (ref 41–52)
HDLC SERPL-MCNC: 80.6 MG/DL
HGB BLD-MCNC: 14.1 G/DL (ref 13.5–17.5)
HYALINE CASTS #/AREA URNS AUTO: ABNORMAL /LPF
KETONES UR STRIP.AUTO-MCNC: NEGATIVE MG/DL
LDLC SERPL CALC-MCNC: 108 MG/DL
LEUKOCYTE ESTERASE UR QL STRIP.AUTO: NEGATIVE
MCH RBC QN AUTO: 30.5 PG (ref 26–34)
MCHC RBC AUTO-ENTMCNC: 31.8 G/DL (ref 32–36)
MCV RBC AUTO: 96 FL (ref 80–100)
MUCOUS THREADS #/AREA URNS AUTO: ABNORMAL /LPF
NITRITE UR QL STRIP.AUTO: NEGATIVE
NON HDL CHOLESTEROL: 127 MG/DL (ref 0–149)
NRBC BLD-RTO: 0 /100 WBCS (ref 0–0)
PH UR STRIP.AUTO: 5 [PH]
PLATELET # BLD AUTO: 194 X10*3/UL (ref 150–450)
POTASSIUM SERPL-SCNC: 4.3 MMOL/L (ref 3.5–5.3)
PROT SERPL-MCNC: 7 G/DL (ref 6.4–8.2)
PROT UR STRIP.AUTO-MCNC: ABNORMAL MG/DL
PSA SERPL-MCNC: 3.72 NG/ML
RBC # BLD AUTO: 4.63 X10*6/UL (ref 4.5–5.9)
RBC # UR STRIP.AUTO: ABNORMAL /UL
RBC #/AREA URNS AUTO: ABNORMAL /HPF
SODIUM SERPL-SCNC: 142 MMOL/L (ref 136–145)
SP GR UR STRIP.AUTO: 1.02
TESTOST SERPL-MCNC: 294 NG/DL (ref 240–1000)
TRIGL SERPL-MCNC: 98 MG/DL (ref 0–149)
TSH SERPL-ACNC: 1.74 MIU/L (ref 0.44–3.98)
UROBILINOGEN UR STRIP.AUTO-MCNC: 2 MG/DL
VLDL: 20 MG/DL (ref 0–40)
WBC # BLD AUTO: 5.6 X10*3/UL (ref 4.4–11.3)
WBC #/AREA URNS AUTO: ABNORMAL /HPF

## 2024-02-16 PROCEDURE — 82306 VITAMIN D 25 HYDROXY: CPT

## 2024-02-16 PROCEDURE — 36415 COLL VENOUS BLD VENIPUNCTURE: CPT

## 2024-02-16 PROCEDURE — 80061 LIPID PANEL: CPT

## 2024-02-16 PROCEDURE — 84153 ASSAY OF PSA TOTAL: CPT

## 2024-02-16 PROCEDURE — 80053 COMPREHEN METABOLIC PANEL: CPT

## 2024-02-16 PROCEDURE — 84403 ASSAY OF TOTAL TESTOSTERONE: CPT

## 2024-02-16 PROCEDURE — 85027 COMPLETE CBC AUTOMATED: CPT

## 2024-02-16 PROCEDURE — 84443 ASSAY THYROID STIM HORMONE: CPT

## 2024-02-16 PROCEDURE — 81001 URINALYSIS AUTO W/SCOPE: CPT

## 2024-02-17 LAB — 25(OH)D3 SERPL-MCNC: 33 NG/ML (ref 30–100)

## 2024-02-20 ENCOUNTER — TELEPHONE (OUTPATIENT)
Dept: PRIMARY CARE | Facility: CLINIC | Age: 84
End: 2024-02-20
Payer: MEDICARE

## 2024-02-20 NOTE — TELEPHONE ENCOUNTER
REFILL REQUEST    Med: Metoprolol Tartrate  Med Dose: 25 mg  Med Frequency: one tab twice daily    Pharmacy:   Pharmacy Address: [address] [place] [address] in [facility]    LR: 12/27/2022  LV: 12/28/2023  NV: 06/28/2024

## 2024-04-12 ENCOUNTER — TELEPHONE (OUTPATIENT)
Dept: PRIMARY CARE | Facility: CLINIC | Age: 84
End: 2024-04-12
Payer: MEDICARE

## 2024-04-12 DIAGNOSIS — F17.200 NICOTINE DEPENDENCE, UNSPECIFIED, UNCOMPLICATED: ICD-10-CM

## 2024-04-12 DIAGNOSIS — I10 HYPERTENSION, UNSPECIFIED TYPE: ICD-10-CM

## 2024-04-12 DIAGNOSIS — I25.10 CORONARY ARTERY DISEASE INVOLVING NATIVE CORONARY ARTERY OF NATIVE HEART WITHOUT ANGINA PECTORIS: Primary | ICD-10-CM

## 2024-04-12 RX ORDER — TERAZOSIN 1 MG/1
1 CAPSULE ORAL DAILY
Qty: 30 CAPSULE | Refills: 0 | Status: SHIPPED | OUTPATIENT
Start: 2024-04-12 | End: 2024-05-16 | Stop reason: SDUPTHER

## 2024-04-12 RX ORDER — TERAZOSIN 1 MG/1
1 CAPSULE ORAL DAILY
Qty: 90 CAPSULE | Refills: 2 | OUTPATIENT
Start: 2024-04-12

## 2024-04-12 RX ORDER — CLOPIDOGREL BISULFATE 75 MG/1
75 TABLET ORAL DAILY
Qty: 90 TABLET | Refills: 2 | OUTPATIENT
Start: 2024-04-12

## 2024-04-12 RX ORDER — CLOPIDOGREL BISULFATE 75 MG/1
75 TABLET ORAL DAILY
Qty: 30 TABLET | Refills: 0 | Status: SHIPPED | OUTPATIENT
Start: 2024-04-12 | End: 2024-05-16 | Stop reason: SDUPTHER

## 2024-04-12 NOTE — TELEPHONE ENCOUNTER
Dr. Amador's patient   Dr. Mcdaniels covering   Patient needs meds  only a few pills left      terazosin (Hytrin) 1 mg capsule   Take 1 capsule (1 mg) by mouth once daily    12/28/2023    clopidogrel (Plavix) 75 mg tablet   Take 1 tablet (75 mg) by mouth once daily.   LF 12/28/2023    LV 12/28/2023  NV 5/16/2024    [facility]  [address]

## 2024-05-16 ENCOUNTER — OFFICE VISIT (OUTPATIENT)
Dept: PRIMARY CARE | Facility: CLINIC | Age: 84
End: 2024-05-16
Payer: MEDICARE

## 2024-05-16 VITALS
HEART RATE: 52 BPM | SYSTOLIC BLOOD PRESSURE: 103 MMHG | WEIGHT: 151 LBS | OXYGEN SATURATION: 97 % | HEIGHT: 69 IN | BODY MASS INDEX: 22.36 KG/M2 | DIASTOLIC BLOOD PRESSURE: 67 MMHG

## 2024-05-16 DIAGNOSIS — E78.5 HYPERLIPIDEMIA, UNSPECIFIED HYPERLIPIDEMIA TYPE: ICD-10-CM

## 2024-05-16 DIAGNOSIS — I10 HYPERTENSION, ESSENTIAL: ICD-10-CM

## 2024-05-16 DIAGNOSIS — N18.31 STAGE 3A CHRONIC KIDNEY DISEASE (MULTI): ICD-10-CM

## 2024-05-16 DIAGNOSIS — F17.210 CIGARETTE NICOTINE DEPENDENCE WITHOUT COMPLICATION: ICD-10-CM

## 2024-05-16 DIAGNOSIS — I10 HYPERTENSION, UNSPECIFIED TYPE: Primary | ICD-10-CM

## 2024-05-16 DIAGNOSIS — L21.9 DERMATITIS, SEBORRHEIC: ICD-10-CM

## 2024-05-16 DIAGNOSIS — F41.9 ANXIETY: ICD-10-CM

## 2024-05-16 DIAGNOSIS — I73.9 PAD (PERIPHERAL ARTERY DISEASE) (CMS-HCC): ICD-10-CM

## 2024-05-16 DIAGNOSIS — J44.9 CHRONIC OBSTRUCTIVE PULMONARY DISEASE, UNSPECIFIED COPD TYPE (MULTI): ICD-10-CM

## 2024-05-16 DIAGNOSIS — R39.14 BENIGN PROSTATIC HYPERPLASIA WITH INCOMPLETE BLADDER EMPTYING: ICD-10-CM

## 2024-05-16 DIAGNOSIS — N40.1 BENIGN PROSTATIC HYPERPLASIA WITH INCOMPLETE BLADDER EMPTYING: ICD-10-CM

## 2024-05-16 DIAGNOSIS — M47.26 OSTEOARTHRITIS OF SPINE WITH RADICULOPATHY, LUMBAR REGION: ICD-10-CM

## 2024-05-16 DIAGNOSIS — I73.9 CLAUDICATION OF LOWER EXTREMITY (CMS-HCC): ICD-10-CM

## 2024-05-16 DIAGNOSIS — I25.10 CORONARY ARTERY DISEASE INVOLVING NATIVE CORONARY ARTERY OF NATIVE HEART WITHOUT ANGINA PECTORIS: ICD-10-CM

## 2024-05-16 PROCEDURE — 3074F SYST BP LT 130 MM HG: CPT | Performed by: FAMILY MEDICINE

## 2024-05-16 PROCEDURE — 1159F MED LIST DOCD IN RCRD: CPT | Performed by: FAMILY MEDICINE

## 2024-05-16 PROCEDURE — 3078F DIAST BP <80 MM HG: CPT | Performed by: FAMILY MEDICINE

## 2024-05-16 PROCEDURE — 99215 OFFICE O/P EST HI 40 MIN: CPT | Performed by: FAMILY MEDICINE

## 2024-05-16 PROCEDURE — 1160F RVW MEDS BY RX/DR IN RCRD: CPT | Performed by: FAMILY MEDICINE

## 2024-05-16 RX ORDER — TRAMADOL HYDROCHLORIDE 50 MG/1
50 TABLET ORAL EVERY 6 HOURS PRN
Qty: 60 TABLET | Refills: 2 | Status: SHIPPED | OUTPATIENT
Start: 2024-05-16

## 2024-05-16 RX ORDER — ATORVASTATIN CALCIUM 40 MG/1
40 TABLET, FILM COATED ORAL DAILY
Qty: 90 TABLET | Refills: 2 | Status: SHIPPED | OUTPATIENT
Start: 2024-05-16

## 2024-05-16 RX ORDER — FLUOCINOLONE ACETONIDE 0.1 MG/ML
SOLUTION TOPICAL 2 TIMES DAILY
Qty: 60 ML | Refills: 2 | Status: SHIPPED | OUTPATIENT
Start: 2024-05-16 | End: 2025-05-16

## 2024-05-16 RX ORDER — FLUOXETINE HYDROCHLORIDE 20 MG/1
20 CAPSULE ORAL DAILY
Qty: 90 CAPSULE | Refills: 2 | Status: SHIPPED | OUTPATIENT
Start: 2024-05-16

## 2024-05-16 RX ORDER — TERAZOSIN 1 MG/1
1 CAPSULE ORAL DAILY
Qty: 30 CAPSULE | Refills: 0 | Status: SHIPPED | OUTPATIENT
Start: 2024-05-16 | End: 2024-08-14

## 2024-05-16 RX ORDER — DAPAGLIFLOZIN 10 MG/1
10 TABLET, FILM COATED ORAL DAILY
Qty: 90 TABLET | Refills: 2 | Status: SHIPPED | OUTPATIENT
Start: 2024-05-16

## 2024-05-16 RX ORDER — ALFUZOSIN HYDROCHLORIDE 10 MG/1
10 TABLET, EXTENDED RELEASE ORAL DAILY
Qty: 90 TABLET | Refills: 2 | Status: SHIPPED | OUTPATIENT
Start: 2024-05-16 | End: 2025-02-10

## 2024-05-16 RX ORDER — LOSARTAN POTASSIUM 100 MG/1
50 TABLET ORAL 2 TIMES DAILY
Qty: 90 TABLET | Refills: 2 | Status: SHIPPED | OUTPATIENT
Start: 2024-05-16

## 2024-05-16 RX ORDER — LOSARTAN POTASSIUM 100 MG/1
50 TABLET ORAL 2 TIMES DAILY
Qty: 90 TABLET | Refills: 2 | Status: SHIPPED | OUTPATIENT
Start: 2024-05-16 | End: 2024-05-16

## 2024-05-16 RX ORDER — CLOPIDOGREL BISULFATE 75 MG/1
75 TABLET ORAL DAILY
Qty: 30 TABLET | Refills: 0 | Status: SHIPPED | OUTPATIENT
Start: 2024-05-16 | End: 2024-06-15

## 2024-05-16 RX ORDER — METOPROLOL TARTRATE 25 MG/1
25 TABLET, FILM COATED ORAL 2 TIMES DAILY
Qty: 180 TABLET | Refills: 2 | Status: SHIPPED | OUTPATIENT
Start: 2024-05-16

## 2024-05-16 NOTE — PATIENT INSTRUCTIONS
Stop atenolol;  continue Lopressor (metoprolol tartrate) twice daily    Prostate enlargement:  try Uroxatrol 10mg once daily for at least 3 months - then decide if it is helpful.        Ways to Help Prevent Falls at Home    Quick Tips   ? Ask for help if you need it. Most people want to help!   ? Get up slowly after sitting or laying down   ? Wear a medical alert device or keep cell phone in your pocket   ? Use night lights, especially areas near a bathroom   ? Keep the items you use often within reach on a small stool or end table   ? Use an assistive device such as walker or cane, as directed by provider/physical therapy   ? Use a non-slip mat and grab bars in your bathroom. Look for home health sections for best options     Other Areas to Focus On   ? Exercise and nutrition: Regular exercise or taking a falls prevention class are great ways improve strength and balance. Don’t forget to stay hydrated and bring a snack!   ? Medicine side effects: Some medicines can make you sleepy or dizzy, which could cause a fall. Ask your healthcare provider about the side effects your medicines could cause. Be sure to let them know if you take any vitamins or supplements as well.   ? Tripping hazards: Remove items you could trip on, such as loose mats, rugs, cords, and clutter. Wear closed toe shoes with rubber soles.   ? Health and wellness: Get regular checkups with your healthcare provider, plus routine vision and hearing screenings. Talk with your healthcare provider about:   o Your medicines and the possible side effects - bring them in a bag if that is easier!   o Problems with balance or feeling dizzy   o Ways to promote bone health, such as Vitamin D and calcium supplements   o Questions or concerns about falling     *Ask your healthcare team if you have questions     ©[facility], 2022

## 2024-05-16 NOTE — PROGRESS NOTES
General Medical Management Note    83 y.o. male presents for Medical Management  HPI  Confusion with current medications.  Patient brings in 5 bottles of medication including Ultram, Hytrin, Cozaar, Prozac, and Plavix.  His medication list also includes Lipitor, Farxiga, hydrochlorothiazide, Lopressor and Tenormin.  Patient does not seem to be aware that these medications have been prescribed.  Tenormin will be discontinued in favor of Lopressor.  All medications will be refilled today and will be viewable on the after visit summary.    New onset skin rash - 2 months ago - right scalp.  Itching.  Flaking.  No bleeding.  Covered by hair.    Another new complaint is more bothersome BPH symptoms.  Patient is interested in trying a medication to determine if it would be effective at relieving some of his symptoms.    Continues to smoke cigarettes.  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  He is working on getting her medical insurance changed to Medicaid and for her to live in an intermediate assistance living facility.      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 condition    Occlusion and stenosis of bilateral carotid arteries 09/26/2016    Asymptomatic bilateral carotid artery stenosis    Occlusion and stenosis of left carotid artery 10/19/2020    Occlusion of carotid artery without cerebral infarction, left    Other conditions influencing health status 10/03/2016    Normal cardiac stress test    Other general symptoms and signs 12/23/2016    Excessive oral secretions     Parageusia 04/19/2020    Ageusia    Personal history of other mental and behavioral disorders 09/11/2019    History of anxiety    Personal history of other specified conditions 12/23/2016    History of dysphagia    Unspecified macular degeneration 10/03/2016    Macular degeneration      Past Surgical History:   Procedure Laterality Date    CAROTID ENDARTERECTOMY  04/19/2020    Carotid Thromboendarterectomy    OTHER SURGICAL HISTORY  12/27/2022    Lumbar laminectomy    TONSILLECTOMY  10/03/2016    Tonsillectomy     Family History   Problem Relation Name Age of Onset    Other (malignant neoplasm) Mother      Other (cerebral infarction) Father      Emphysema Father      Hypertension Father        Social History     Socioeconomic History    Marital status:      Spouse name: Not on file    Number of children: Not on file    Years of education: Not on file    Highest education level: Not on file   Occupational History    Not on file   Tobacco Use    Smoking status: Every Day     Current packs/day: 0.50     Average packs/day: 0.5 packs/day for 40.0 years (20.0 ttl pk-yrs)     Types: Cigarettes    Smokeless tobacco: Never   Substance and Sexual Activity    Alcohol use: Yes     Alcohol/week: 8.0 standard drinks of alcohol     Types: 7 Cans of beer, 1 Shots of liquor per week    Drug use: Never     Comment: Ultram    Sexual activity: Not on file   Other Topics Concern    Not on file   Social History Narrative    Not on file     Social Determinants of Health     Financial Resource Strain: Not on file   Food Insecurity: Not on file   Transportation Needs: Not on file   Physical Activity: Not on file   Stress: Not on file   Social Connections: Not on file   Intimate Partner Violence: Not on file   Housing Stability: Not on file       Current Outpatient Medications on File Prior to Visit   Medication Sig Dispense Refill    acetaminophen (Tylenol) 325 mg tablet Take 2 tablets (650 mg) by mouth every 4 hours if needed for  "mild pain (1 - 3) or fever (temp greater than 38.0 C).      clopidogrel (Plavix) 75 mg tablet Take 1 tablet (75 mg) by mouth once daily. 30 tablet 0    EPINEPHrine 0.3 mg/0.3 mL injection syringe Inject 0.3 mL (0.3 mg) as directed if needed for anaphylaxis (Inject 0.3ml Intramusculary as Directed). Inject into upper leg. Call 911 after use.      FLUoxetine (PROzac) 20 mg capsule Take 1 capsule (20 mg) by mouth once daily. 90 capsule 2    losartan (Cozaar) 100 mg tablet Take 0.5 tablets (50 mg) by mouth twice a day.  Take by mouth twice daily. 1/2 tab 2 times a day      terazosin (Hytrin) 1 mg capsule Take 1 capsule (1 mg) by mouth once daily. 30 capsule 0    traMADol (Ultram) 50 mg tablet Take 1 tablet (50 mg) by mouth every 6 hours if needed for severe pain (7 - 10) (As Needed for Pain). 60 tablet 2    aspirin 81 mg EC tablet Take 1 tablet (81 mg) by mouth once daily. As Directed      atenolol (Tenormin) 25 mg tablet Take 1 tablet (25 mg) by mouth once daily.      atorvastatin (Lipitor) 40 mg tablet Take 1 tablet (40 mg) by mouth once daily. As Directed (Patient not taking: Reported on 5/16/2024) 90 tablet 2    dapagliflozin propanediol (Farxiga) 10 mg Take 1 tablet (10 mg) by mouth once daily.      hydroCHLOROthiazide (HYDRODiuril) 25 mg tablet Take 1 tablet (25 mg) by mouth once daily.      metoprolol tartrate (Lopressor) 25 mg tablet Take 1 tablet (25 mg) by mouth 2 times a day.      multivitamin tablet Take by mouth.       No current facility-administered medications on file prior to visit.       Allergies   Allergen Reactions    Bee Venom Protein (Honey Bee) Unknown         ROS: Denies chest pain, SOB, Headache, GI problems     Visit Vitals  /67   Pulse 52   Ht 1.753 m (5' 9\")   Wt 68.5 kg (151 lb)   SpO2 97%   BMI 22.30 kg/m²   Smoking Status Every Day   BSA 1.83 m²        PHYSICAL EXAM:  Alert and oriented x3.  Eyes: EOM grossly intact  Neck supple without lymph adenopathy or carotid bruit.  No masses " or thyromegaly  Heart regular rate and rhythm without murmur.  Lungs clear to auscultation.  Legs without edema.  Gait is non-antalgic  Speech clear.  Hearing adequate.          DIAGNOSIS/PLAN:  1. Hypertension, unspecified type  - Comprehensive Metabolic Panel; Future  - terazosin (Hytrin) 1 mg capsule; Take 1 capsule (1 mg) by mouth once daily.  Dispense: 30 capsule; Refill: 0    2. Claudication of lower extremity (CMS-HCC)  Continue Plavix    3. Stage 3a chronic kidney disease (Multi)  -Continue Farxiga 10 mg daily if affordable.    4. Benign prostatic hyperplasia with incomplete bladder emptying  - alfuzosin (UroxatraL) 10 mg 24 hr tablet; Take 1 tablet (10 mg) by mouth once daily. For Prostate enlargement.  Do not crush, chew, or split.  Dispense: 90 tablet; Refill: 2  -Reviewed mechanism of action and potential adverse effects as well as benefits.  Patient will use for 3 months and then decide if symptom relief is significant enough to continue.    5. Hyperlipidemia, unspecified hyperlipidemia type  - atorvastatin (Lipitor) 40 mg tablet; Take 1 tablet (40 mg) by mouth once daily. As Directed  Dispense: 90 tablet; Refill: 2    6. Coronary artery disease involving native coronary artery of native heart without angina pectoris  - clopidogrel (Plavix) 75 mg tablet; Take 1 tablet (75 mg) by mouth once daily.  Dispense: 30 tablet; Refill: 0  - metoprolol tartrate (Lopressor) 25 mg tablet; Take 1 tablet (25 mg) by mouth 2 times a day.  Dispense: 180 tablet; Refill: 2  -Continue Farxiga 10 mg daily if affordable    7. Anxiety  - FLUoxetine (PROzac) 20 mg capsule; Take 1 capsule (20 mg) by mouth once daily.  Dispense: 90 capsule; Refill: 2    8. Osteoarthritis of spine with radiculopathy, lumbar region  - traMADol (Ultram) 50 mg tablet; Take 1 tablet (50 mg) by mouth every 6 hours if needed for severe pain (7 - 10) (As Needed for Pain).  Dispense: 60 tablet; Refill: 2    9. Hypertension, essential  - metoprolol  tartrate (Lopressor) 25 mg tablet; Take 1 tablet (25 mg) by mouth 2 times a day.  Dispense: 180 tablet; Refill: 2  - losartan (Cozaar) 100 mg tablet; Take 0.5 tablets (50 mg) by mouth 2 times a day. 1/2 tab 2 times a day  Dispense: 90 tablet; Refill: 2    10. PAD (peripheral artery disease) (CMS-[facility])  Has been evaluated by cardiology who gave him exercises to do 3 times per day.  Patient states that he is somewhat compliant and is not bothered by worsening symptoms in his legs.  - clopidogrel (Plavix) 75 mg tablet; Take 1 tablet (75 mg) by mouth once daily.  Dispense: 30 tablet; Refill: 0    11. Dermatitis, seborrheic  - fluocinolone (Synalar) 0.01 % external solution; Apply topically 2 times a day.  Dispense: 60 mL; Refill: 2  - Referral to Dermatology  -If patient does not get relief with Synalar lotion within 2 months, he will see dermatologist for evaluation.    12.  COPD with distant smoking status  -Breathing remains essentially asymptomatic.  - Tobacco use: Patient encouraged to stop smoking.  The patient is aware of the detrimental effects of long-term smoking on overall health and life expectancy.  Call [phone]-QUIT-NOW for additional stop smoking counselling free of charge.    Over the past several years, I have counseled the patient about smoking/tobacco cessation and how I can support efforts when patient is ready to quit.  I have discussed nicotine replacement therapy, Chantix, Wellbutrin, hypnosis, support groups and acupuncture as potential options.  Patient currently has no signs or symptoms of tobacco related disease.  If interested in hypnotism, ask for referral to [facility] or call [facility] in [place] at [phone]    Follow up in 3 months for continuation of UTRAM; 6 months for annual comprehensive evaluation    I will continue to monitor, evaluate, assess and treat all problems/diagnoses as appropriate and continue to collaborate with specialists.    Contact  office or send a  MY Chart message with any questions or concerns    Encouraged to sign up with my  My Chart  Patient will only be notified of labs that require medical intervention.    Prescriptions will not be filled unless you are compliant with your follow up appointments or have a follow up appointment scheduled as per instruction of your physician. Refills should be requested at the time of your visit.    **Charting was completed using voice recognition technology and may include unintended errors**    Bassem Amador DO, FACOFP  [address]  [place] [address] [phone]  Bassem Amador DO, FACOFP    Patient was identified as a fall risk. Risk prevention instructions provided.

## 2024-06-27 ENCOUNTER — TELEPHONE (OUTPATIENT)
Dept: PRIMARY CARE | Facility: CLINIC | Age: 84
End: 2024-06-27
Payer: MEDICARE

## 2024-06-27 DIAGNOSIS — I10 HYPERTENSION, UNSPECIFIED TYPE: ICD-10-CM

## 2024-06-28 ENCOUNTER — APPOINTMENT (OUTPATIENT)
Dept: PRIMARY CARE | Facility: CLINIC | Age: 84
End: 2024-06-28
Payer: MEDICARE

## 2024-06-28 RX ORDER — TERAZOSIN 1 MG/1
1 CAPSULE ORAL DAILY
Qty: 90 CAPSULE | Refills: 0 | Status: SHIPPED | OUTPATIENT
Start: 2024-06-28 | End: 2024-09-26

## 2024-07-01 ENCOUNTER — APPOINTMENT (OUTPATIENT)
Dept: PRIMARY CARE | Facility: CLINIC | Age: 84
End: 2024-07-01
Payer: MEDICARE

## 2024-08-12 ENCOUNTER — APPOINTMENT (OUTPATIENT)
Dept: PRIMARY CARE | Facility: CLINIC | Age: 84
End: 2024-08-12
Payer: MEDICARE

## 2024-08-13 ENCOUNTER — OFFICE VISIT (OUTPATIENT)
Dept: PRIMARY CARE | Facility: CLINIC | Age: 84
End: 2024-08-13
Payer: MEDICARE

## 2024-08-13 DIAGNOSIS — M48.062 SPINAL STENOSIS, LUMBAR REGION, WITH NEUROGENIC CLAUDICATION: Primary | ICD-10-CM

## 2024-08-13 PROCEDURE — 99213 OFFICE O/P EST LOW 20 MIN: CPT | Performed by: FAMILY MEDICINE

## 2024-08-13 PROCEDURE — 1160F RVW MEDS BY RX/DR IN RCRD: CPT | Performed by: FAMILY MEDICINE

## 2024-08-13 PROCEDURE — 1159F MED LIST DOCD IN RCRD: CPT | Performed by: FAMILY MEDICINE

## 2024-08-13 NOTE — PROGRESS NOTES
HPI 83 y.o. male presents for evaluation and refill of controlled substance.  Patient had been using Ultram 50 mg once or twice daily to manage chronic lower back pain.  His last refill was on May 16.  He was a no-show for his refill appointment yesterday.  Patient states he forgot.  He states that his last tramadol dose was more than 1 month ago.  He does not feel that the medication is helpful or that he needs anything stronger.    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 condition    Occlusion and stenosis of bilateral carotid arteries 09/26/2016    Asymptomatic bilateral carotid artery stenosis    Occlusion and stenosis of left carotid artery 10/19/2020    Occlusion of carotid artery without cerebral infarction, left    Other conditions influencing health status 10/03/2016    Normal cardiac stress test    Other general symptoms and signs 12/23/2016    Excessive oral secretions    Parageusia 04/19/2020    Ageusia    Personal history of other mental and behavioral disorders 09/11/2019    History of anxiety    Personal history of other specified conditions 12/23/2016    History of dysphagia    Unspecified macular degeneration 10/03/2016    Macular degeneration      Past Surgical History:   Procedure Laterality Date    CAROTID ENDARTERECTOMY  04/19/2020    Carotid Thromboendarterectomy    OTHER SURGICAL HISTORY  12/27/2022    Lumbar laminectomy    TONSILLECTOMY  10/03/2016    Tonsillectomy     Family History   Problem Relation Name Age of Onset    Other (malignant neoplasm) Mother      Other (cerebral infarction) Father      Emphysema Father       Hypertension Father        Social History     Socioeconomic History    Marital status:      Spouse name: Not on file    Number of children: Not on file    Years of education: Not on file    Highest education level: Not on file   Occupational History    Not on file   Tobacco Use    Smoking status: Every Day     Current packs/day: 0.50     Average packs/day: 0.5 packs/day for 40.0 years (20.0 ttl pk-yrs)     Types: Cigarettes    Smokeless tobacco: Never   Substance and Sexual Activity    Alcohol use: Yes     Alcohol/week: 8.0 standard drinks of alcohol     Types: 7 Cans of beer, 1 Shots of liquor per week    Drug use: Yes     Comment: Ultram    Sexual activity: Not on file   Other Topics Concern    Not on file   Social History Narrative    Not on file     Social Determinants of Health     Financial Resource Strain: Not on file   Food Insecurity: Not on file   Transportation Needs: Not on file   Physical Activity: Not on file   Stress: Not on file   Social Connections: Not on file   Intimate Partner Violence: Not on file   Housing Stability: Not on file       Current Outpatient Medications on File Prior to Visit   Medication Sig Dispense Refill    acetaminophen (Tylenol) 325 mg tablet Take 2 tablets (650 mg) by mouth every 4 hours if needed for mild pain (1 - 3) or fever (temp greater than 38.0 C).      alfuzosin (UroxatraL) 10 mg 24 hr tablet Take 1 tablet (10 mg) by mouth once daily. For Prostate enlargement.  Do not crush, chew, or split. 90 tablet 2    aspirin 81 mg EC tablet Take 1 tablet (81 mg) by mouth once daily. As Directed      atorvastatin (Lipitor) 40 mg tablet Take 1 tablet (40 mg) by mouth once daily. As Directed 90 tablet 2    dapagliflozin propanediol (Farxiga) 10 mg Take 1 tablet (10 mg) by mouth once daily. For heart and kidney conditions 90 tablet 2    EPINEPHrine 0.3 mg/0.3 mL injection syringe Inject 0.3 mL (0.3 mg) as directed if needed for anaphylaxis (Inject 0.3ml Intramusculary as  Directed). Inject into upper leg. Call 911 after use.      fluocinolone (Synalar) 0.01 % external solution Apply topically 2 times a day. 60 mL 2    FLUoxetine (PROzac) 20 mg capsule Take 1 capsule (20 mg) by mouth once daily. 90 capsule 2    hydroCHLOROthiazide (HYDRODiuril) 25 mg tablet Take 1 tablet (25 mg) by mouth once daily.      losartan (Cozaar) 100 mg tablet Take 0.5 tablets (50 mg) by mouth 2 times a day. 1/2 tab 2 times a day 90 tablet 2    metoprolol tartrate (Lopressor) 25 mg tablet Take 1 tablet (25 mg) by mouth 2 times a day. 180 tablet 2    multivitamin tablet Take by mouth.      terazosin (Hytrin) 1 mg capsule Take 1 capsule (1 mg) by mouth once daily. 90 capsule 0    traMADol (Ultram) 50 mg tablet Take 1 tablet (50 mg) by mouth every 6 hours if needed for severe pain (7 - 10) (As Needed for Pain). 60 tablet 2     No current facility-administered medications on file prior to visit.       Allergies   Allergen Reactions    Bee Venom Protein (Honey Bee) Unknown       Visit Vitals  Smoking Status Every Day        EXAM:  Alert and oriented ×3.  No acute distress.  No tremors noted.  Gait is normal.  Mood and affect are normal.     Assessment/Diagnosis  1. Spinal stenosis, lumbar region, with neurogenic claudication  Patient not requesting narcotic medication refill.            Plan    [place] reviewed.  Controlled Substance Agreement is current.  Urine drug screen up to date.    CONTROLLED SUBSTANCE USE:   Patient is aware of Dr. Amador's and Dedra Nassar's practice rules for use of scheduled medication.  Has a signed contract stating that patient will only receive controlled substance prescriptions from Dr. Amador, will only receive a one month supply, will fill prescriptions at one pharmacy, and agrees to a random urine drug screen.  Patient is aware that she must have an office appointment every 90 days to continue to receive benzodiazepines or narcotics.        Follow up in office as scheduled for medical  management    I will continue to monitor, evaluate, assess and treat all problems/diagnoses as appropriate and continue to collaborate with specialists.    Contact office or send a  [employer] message with any questions or concerns    Patient will only be notified of labs that require medical intervention.    Prescriptions will not be filled unless you are compliant with your follow up appointments or have a follow up appointment scheduled as per instruction of your physician. Refills should be requested at the time of your visit.    **Charting was completed using voice recognition technology and may include unintended errors**    Bassem Amador DO, FACOFP  Senior Attending Physician  [facility]  [address], [address]  [place] [address] [phone]

## 2024-10-08 ENCOUNTER — TELEPHONE (OUTPATIENT)
Dept: PRIMARY CARE | Facility: CLINIC | Age: 84
End: 2024-10-08
Payer: MEDICARE

## 2024-10-08 DIAGNOSIS — I73.9 PAD (PERIPHERAL ARTERY DISEASE) (CMS-HCC): ICD-10-CM

## 2024-10-08 DIAGNOSIS — I10 HYPERTENSION, UNSPECIFIED TYPE: ICD-10-CM

## 2024-10-08 DIAGNOSIS — I25.10 CORONARY ARTERY DISEASE INVOLVING NATIVE CORONARY ARTERY OF NATIVE HEART WITHOUT ANGINA PECTORIS: ICD-10-CM

## 2024-10-08 RX ORDER — TERAZOSIN 1 MG/1
1 CAPSULE ORAL DAILY
Qty: 90 CAPSULE | Refills: 0 | Status: SHIPPED | OUTPATIENT
Start: 2024-10-08 | End: 2025-01-06

## 2024-10-08 NOTE — TELEPHONE ENCOUNTER
REFILL REQUEST    Med: Terazosin   Med Dose: 1 mg   Med Frequency: one tab daily    Pharmacy: [facility]  Pharmacy Address: [address] [place] [address] in [facility]     LR: 06/28/2024  LV: 08/13/2024  NV: 11/15/2024

## 2024-10-10 RX ORDER — CLOPIDOGREL BISULFATE 75 MG/1
75 TABLET ORAL DAILY
Qty: 30 TABLET | Refills: 0 | OUTPATIENT
Start: 2024-10-10

## 2024-10-10 RX ORDER — TERAZOSIN 1 MG/1
1 CAPSULE ORAL DAILY
Qty: 90 CAPSULE | Refills: 0 | OUTPATIENT
Start: 2024-10-10

## 2024-10-13 DIAGNOSIS — M47.26 OSTEOARTHRITIS OF SPINE WITH RADICULOPATHY, LUMBAR REGION: ICD-10-CM

## 2024-10-14 RX ORDER — TRAMADOL HYDROCHLORIDE 50 MG/1
TABLET ORAL
Qty: 60 TABLET | Refills: 2 | OUTPATIENT
Start: 2024-10-14

## 2024-11-05 ENCOUNTER — HOSPITAL ENCOUNTER (OUTPATIENT)
Dept: RADIOLOGY | Facility: HOSPITAL | Age: 84
Discharge: HOME | End: 2024-11-05
Payer: MEDICARE

## 2024-11-05 DIAGNOSIS — I73.9 PAD (PERIPHERAL ARTERY DISEASE) (CMS-HCC): ICD-10-CM

## 2024-11-05 PROCEDURE — 93922 UPR/L XTREMITY ART 2 LEVELS: CPT

## 2024-11-05 PROCEDURE — 93922 UPR/L XTREMITY ART 2 LEVELS: CPT | Performed by: INTERNAL MEDICINE

## 2024-11-07 ENCOUNTER — OFFICE VISIT (OUTPATIENT)
Dept: VASCULAR SURGERY | Facility: CLINIC | Age: 84
End: 2024-11-07
Payer: MEDICARE

## 2024-11-07 VITALS
DIASTOLIC BLOOD PRESSURE: 92 MMHG | WEIGHT: 152 LBS | BODY MASS INDEX: 21.76 KG/M2 | SYSTOLIC BLOOD PRESSURE: 168 MMHG | HEART RATE: 59 BPM | HEIGHT: 70 IN | TEMPERATURE: 96.6 F | RESPIRATION RATE: 16 BRPM

## 2024-11-07 DIAGNOSIS — I73.9 PERIPHERAL VASCULAR DISEASE, UNSPECIFIED (CMS-HCC): Primary | ICD-10-CM

## 2024-11-07 PROCEDURE — 99213 OFFICE O/P EST LOW 20 MIN: CPT | Performed by: SURGERY

## 2024-11-07 PROCEDURE — 3077F SYST BP >= 140 MM HG: CPT | Performed by: SURGERY

## 2024-11-07 PROCEDURE — 3080F DIAST BP >= 90 MM HG: CPT | Performed by: SURGERY

## 2024-11-07 PROCEDURE — 1159F MED LIST DOCD IN RCRD: CPT | Performed by: SURGERY

## 2024-11-07 NOTE — PROGRESS NOTES
Vascular Surgery Clinic Note    CC: PAD    HPI:  Honorio Harvey is 84 y.o. male with history of PAD and claudication. He says his walking distance has improved with SET. No rest pain. Still smoking.  I reviewed AUGUSTINE which improved on the right to 0.74 from 0.63, and left stable 0.54 from 0.53.     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    Meds:   Current Outpatient Medications on File Prior to Visit   Medication Sig Dispense Refill    acetaminophen (Tylenol) 325 mg tablet Take 2 tablets (650 mg) by mouth every 4 hours if needed for mild pain (1 - 3) or fever (temp greater than 38.0 C).      alfuzosin (UroxatraL) 10 mg 24 hr tablet Take 1 tablet (10 mg) by mouth once daily. For Prostate enlargement.  Do not crush, chew, or split. 90 tablet 2    atorvastatin (Lipitor) 40 mg tablet Take 1 tablet (40 mg) by mouth once daily. As Directed 90 tablet 2    EPINEPHrine 0.3 mg/0.3 mL injection syringe Inject 0.3 mL (0.3 mg) as directed if needed for anaphylaxis (Inject 0.3ml Intramusculary as Directed). Inject into upper leg. Call 911 after use.      FLUoxetine (PROzac) 20 mg capsule Take 1 capsule (20 mg) by mouth once daily. 90 capsule 2    hydroCHLOROthiazide (HYDRODiuril) 25 mg tablet Take 1 tablet (25 mg) by mouth once daily.      losartan (Cozaar) 100 mg  tablet Take 0.5 tablets (50 mg) by mouth 2 times a day. 1/2 tab 2 times a day 90 tablet 2    metoprolol tartrate (Lopressor) 25 mg tablet Take 1 tablet (25 mg) by mouth 2 times a day. 180 tablet 2    multivitamin tablet Take by mouth.      terazosin (Hytrin) 1 mg capsule Take 1 capsule (1 mg) by mouth once daily. 90 capsule 0    traMADol (Ultram) 50 mg tablet Take 1 tablet (50 mg) by mouth every 6 hours if needed for severe pain (7 - 10) (As Needed for Pain). 60 tablet 2    aspirin 81 mg EC tablet Take 1 tablet (81 mg) by mouth once daily. As Directed (Patient not taking: Reported on 11/7/2024)      dapagliflozin propanediol (Farxiga) 10 mg Take 1 tablet (10 mg) by mouth once daily. For heart and kidney conditions (Patient not taking: Reported on 11/7/2024) 90 tablet 2    fluocinolone (Synalar) 0.01 % external solution Apply topically 2 times a day. 60 mL 2     No current facility-administered medications on file prior to visit.        Allergies:   Allergies   Allergen Reactions    Bee Venom Protein (Honey Bee) Unknown       SH:    Social Drivers of Health     Tobacco Use: High Risk (11/7/2024)    Patient History     Smoking Tobacco Use: Every Day     Smokeless Tobacco Use: Never     Passive Exposure: Not on file   Alcohol Use: Unknown (9/20/2019)    Received from [facility]    AUDIT-C     Frequency of Alcohol Consumption: Monthly or less     Average Number of Drinks: Not on file     Frequency of Binge Drinking: Not on file   Financial Resource Strain: Not on file   Food Insecurity: Not on file   Transportation Needs: Not on file   Physical Activity: Not on file   Stress: Not on file   Social Connections: Not on file   Intimate Partner Violence: Not on file   Depression: Not at risk (12/28/2023)    PHQ-2     PHQ-2 Score: 0   Housing Stability: Not on file   Utilities: Not on file   Digital Equity: Not on file   Health Literacy: Not on file        FH:  Family  History   Problem Relation Name Age of Onset    Other (malignant neoplasm) Mother      Other (cerebral infarction) Father      Emphysema Father      Hypertension Father          ROS:  All systems were reviewed and are negative except as per HPI.    Objective:  Vitals:  Vitals:    11/07/24 1408   BP: (!) 168/92   Pulse: 59   Resp: 16   Temp: 35.9 °C (96.6 °F)        Exam:  In NAD, well appearing  Abd Soft, ND/NT  Vascular examination:  Feet are warm, no pulses    Assessment & Plan:  Honorio Harvey is 84 y.o. male with stable mild claudication. Continue walking. Stop smoking. RTC yearly for AUGUSTINE.      I spent a total of 20 minutes on the day of the visit.         Jeffery Bass M.D.

## 2024-11-14 ENCOUNTER — APPOINTMENT (OUTPATIENT)
Dept: PRIMARY CARE | Facility: CLINIC | Age: 84
End: 2024-11-14
Payer: MEDICARE

## 2024-11-15 ENCOUNTER — APPOINTMENT (OUTPATIENT)
Dept: PRIMARY CARE | Facility: CLINIC | Age: 84
End: 2024-11-15
Payer: MEDICARE

## 2024-11-15 VITALS
HEART RATE: 80 BPM | BODY MASS INDEX: 22.62 KG/M2 | DIASTOLIC BLOOD PRESSURE: 78 MMHG | OXYGEN SATURATION: 97 % | HEIGHT: 70 IN | SYSTOLIC BLOOD PRESSURE: 136 MMHG | WEIGHT: 158 LBS

## 2024-11-15 DIAGNOSIS — R39.14 BENIGN PROSTATIC HYPERPLASIA WITH INCOMPLETE BLADDER EMPTYING: ICD-10-CM

## 2024-11-15 DIAGNOSIS — I25.10 CORONARY ARTERY DISEASE INVOLVING NATIVE CORONARY ARTERY OF NATIVE HEART WITHOUT ANGINA PECTORIS: ICD-10-CM

## 2024-11-15 DIAGNOSIS — N18.31 STAGE 3A CHRONIC KIDNEY DISEASE (MULTI): ICD-10-CM

## 2024-11-15 DIAGNOSIS — F17.200 TOBACCO USE DISORDER: ICD-10-CM

## 2024-11-15 DIAGNOSIS — F17.210 CIGARETTE NICOTINE DEPENDENCE WITHOUT COMPLICATION: ICD-10-CM

## 2024-11-15 DIAGNOSIS — Z51.81 ENCOUNTER FOR THERAPEUTIC DRUG LEVEL MONITORING: ICD-10-CM

## 2024-11-15 DIAGNOSIS — Z02.83 ENCOUNTER FOR DRUG SCREENING: ICD-10-CM

## 2024-11-15 DIAGNOSIS — F41.9 ANXIETY: ICD-10-CM

## 2024-11-15 DIAGNOSIS — Z00.00 MEDICARE ANNUAL WELLNESS VISIT, SUBSEQUENT: ICD-10-CM

## 2024-11-15 DIAGNOSIS — N40.1 BENIGN PROSTATIC HYPERPLASIA WITH INCOMPLETE BLADDER EMPTYING: ICD-10-CM

## 2024-11-15 DIAGNOSIS — I10 HYPERTENSION, ESSENTIAL: ICD-10-CM

## 2024-11-15 DIAGNOSIS — E78.5 HYPERLIPIDEMIA, UNSPECIFIED HYPERLIPIDEMIA TYPE: ICD-10-CM

## 2024-11-15 DIAGNOSIS — I65.23 STENOSIS OF BOTH EXTERNAL CAROTID ARTERIES: ICD-10-CM

## 2024-11-15 DIAGNOSIS — I10 HYPERTENSION, UNSPECIFIED TYPE: Primary | ICD-10-CM

## 2024-11-15 DIAGNOSIS — M47.26 OSTEOARTHRITIS OF SPINE WITH RADICULOPATHY, LUMBAR REGION: ICD-10-CM

## 2024-11-15 PROCEDURE — 3078F DIAST BP <80 MM HG: CPT | Performed by: FAMILY MEDICINE

## 2024-11-15 PROCEDURE — 99497 ADVNCD CARE PLAN 30 MIN: CPT | Performed by: FAMILY MEDICINE

## 2024-11-15 PROCEDURE — 3075F SYST BP GE 130 - 139MM HG: CPT | Performed by: FAMILY MEDICINE

## 2024-11-15 PROCEDURE — 1160F RVW MEDS BY RX/DR IN RCRD: CPT | Performed by: FAMILY MEDICINE

## 2024-11-15 PROCEDURE — 99215 OFFICE O/P EST HI 40 MIN: CPT | Performed by: FAMILY MEDICINE

## 2024-11-15 PROCEDURE — 1170F FXNL STATUS ASSESSED: CPT | Performed by: FAMILY MEDICINE

## 2024-11-15 PROCEDURE — 1159F MED LIST DOCD IN RCRD: CPT | Performed by: FAMILY MEDICINE

## 2024-11-15 PROCEDURE — G0439 PPPS, SUBSEQ VISIT: HCPCS | Performed by: FAMILY MEDICINE

## 2024-11-15 PROCEDURE — 1123F ACP DISCUSS/DSCN MKR DOCD: CPT | Performed by: FAMILY MEDICINE

## 2024-11-15 RX ORDER — FLUOXETINE HYDROCHLORIDE 20 MG/1
20 CAPSULE ORAL DAILY
Qty: 90 CAPSULE | Refills: 2 | Status: SHIPPED | OUTPATIENT
Start: 2024-11-15

## 2024-11-15 RX ORDER — ALFUZOSIN HYDROCHLORIDE 10 MG/1
10 TABLET, EXTENDED RELEASE ORAL DAILY
Qty: 90 TABLET | Refills: 2 | Status: SHIPPED | OUTPATIENT
Start: 2024-11-15 | End: 2025-08-12

## 2024-11-15 RX ORDER — HYDROCHLOROTHIAZIDE 25 MG/1
25 TABLET ORAL DAILY
Qty: 90 TABLET | Refills: 2 | Status: SHIPPED | OUTPATIENT
Start: 2024-11-15

## 2024-11-15 RX ORDER — TRAMADOL HYDROCHLORIDE 50 MG/1
50 TABLET ORAL EVERY 6 HOURS PRN
Qty: 60 TABLET | Refills: 2 | Status: SHIPPED | OUTPATIENT
Start: 2024-11-15

## 2024-11-15 RX ORDER — LOSARTAN POTASSIUM 100 MG/1
50 TABLET ORAL 2 TIMES DAILY
Qty: 90 TABLET | Refills: 2 | Status: SHIPPED | OUTPATIENT
Start: 2024-11-15

## 2024-11-15 RX ORDER — ATORVASTATIN CALCIUM 40 MG/1
40 TABLET, FILM COATED ORAL DAILY
Qty: 90 TABLET | Refills: 2 | Status: SHIPPED | OUTPATIENT
Start: 2024-11-15

## 2024-11-15 RX ORDER — TERAZOSIN 1 MG/1
1 CAPSULE ORAL DAILY
Qty: 90 CAPSULE | Refills: 2 | Status: SHIPPED | OUTPATIENT
Start: 2024-11-15 | End: 2025-08-12

## 2024-11-15 RX ORDER — METOPROLOL TARTRATE 25 MG/1
25 TABLET, FILM COATED ORAL 2 TIMES DAILY
Qty: 180 TABLET | Refills: 2 | Status: SHIPPED | OUTPATIENT
Start: 2024-11-15

## 2024-11-15 ASSESSMENT — ACTIVITIES OF DAILY LIVING (ADL)
MANAGING_FINANCES: INDEPENDENT
DRESSING: INDEPENDENT
GROCERY_SHOPPING: INDEPENDENT
DOING_HOUSEWORK: INDEPENDENT
DRESSING: INDEPENDENT
TAKING_MEDICATION: INDEPENDENT
BATHING: INDEPENDENT
BATHING: INDEPENDENT

## 2024-11-15 ASSESSMENT — PATIENT HEALTH QUESTIONNAIRE - PHQ9
2. FEELING DOWN, DEPRESSED OR HOPELESS: NOT AT ALL
SUM OF ALL RESPONSES TO PHQ9 QUESTIONS 1 AND 2: 0
1. LITTLE INTEREST OR PLEASURE IN DOING THINGS: NOT AT ALL

## 2024-11-15 NOTE — PROGRESS NOTES
General Medical Management Note and annual Medicare wellness visit    84 y.o. male presents for Medical Management  HPI  Patient presents with his wife.  Wife is present because her dementia is significant enough that she cannot be left alone.    Chronic lumbosacral pain due to osteoarthritis.  Status post laminectomy.  Using tramadol 1-3 times daily.  Currently is hunting pheasants and admits to using 3/day when hunting.  His last dose was earlier this morning.  Last refill was October 8, 2024.    Hypertension and coronary artery disease are managed by cardiology.  Status post CABG.  Patient thought he was taking both atenolol and metoprolol.  He should only be taking Lopressor (metoprolol tartrate) twice daily.    CKD 3.  Patient is not taking Farxiga due to cost.    Depression and anxiety are well-controlled with Prozac 20 mg daily    Peripheral artery disease: Monitored by vascular surgeon Dr. Jeffery Bass.  Patient has been exercising every day and a stationary bicycle.  Recent evaluation by Dr. Bass indicated improvement in arterial flow in the lower extremities.    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 condition    Occlusion and stenosis of bilateral carotid arteries 09/26/2016    Asymptomatic bilateral carotid artery stenosis    Occlusion and stenosis of left carotid artery 10/19/2020    Occlusion of carotid artery without cerebral infarction, left    Other conditions influencing health status 10/03/2016    Normal cardiac stress test    Other general symptoms and signs 12/23/2016    Excessive oral secretions    Parageusia  04/19/2020    Ageusia    Personal history of other mental and behavioral disorders 09/11/2019    History of anxiety    Personal history of other specified conditions 12/23/2016    History of dysphagia    Unspecified macular degeneration 10/03/2016    Macular degeneration      Past Surgical History:   Procedure Laterality Date    CAROTID ENDARTERECTOMY  04/19/2020    Carotid Thromboendarterectomy    OTHER SURGICAL HISTORY  12/27/2022    Lumbar laminectomy    TONSILLECTOMY  10/03/2016    Tonsillectomy     Family History   Problem Relation Name Age of Onset    Other (malignant neoplasm) Mother      Other (cerebral infarction) Father      Emphysema Father      Hypertension Father        Social History     Socioeconomic History    Marital status:      Spouse name: Not on file    Number of children: Not on file    Years of education: Not on file    Highest education level: Not on file   Occupational History    Not on file   Tobacco Use    Smoking status: Every Day     Current packs/day: 0.50     Average packs/day: 0.5 packs/day for 40.0 years (20.0 ttl pk-yrs)     Types: Cigarettes    Smokeless tobacco: Never   Substance and Sexual Activity    Alcohol use: Yes     Alcohol/week: 8.0 standard drinks of alcohol     Types: 7 Cans of beer, 1 Shots of liquor per week    Drug use: Not Currently    Sexual activity: Not on file   Other Topics Concern    Not on file   Social History Narrative    Not on file     Social Drivers of Health     Financial Resource Strain: Not on file   Food Insecurity: Not on file   Transportation Needs: Not on file   Physical Activity: Not on file   Stress: Not on file   Social Connections: Not on file   Intimate Partner Violence: Not on file   Housing Stability: Not on file       Current Outpatient Medications on File Prior to Visit   Medication Sig Dispense Refill    acetaminophen (Tylenol) 325 mg tablet Take 2 tablets (650 mg) by mouth every 4 hours if needed for mild pain (1 - 3) or fever  "(temp greater than 38.0 C).      alfuzosin (UroxatraL) 10 mg 24 hr tablet Take 1 tablet (10 mg) by mouth once daily. For Prostate enlargement.  Do not crush, chew, or split. 90 tablet 2    aspirin 81 mg EC tablet Take 1 tablet (81 mg) by mouth once daily. As Directed      EPINEPHrine 0.3 mg/0.3 mL injection syringe Inject 0.3 mL (0.3 mg) as directed if needed for anaphylaxis (Inject 0.3ml Intramusculary as Directed). Inject into upper leg. Call 911 after use.      fluocinolone (Synalar) 0.01 % external solution Apply topically 2 times a day. 60 mL 2    FLUoxetine (PROzac) 20 mg capsule Take 1 capsule (20 mg) by mouth once daily. 90 capsule 2    hydroCHLOROthiazide (HYDRODiuril) 25 mg tablet Take 1 tablet (25 mg) by mouth once daily.      losartan (Cozaar) 100 mg tablet Take 0.5 tablets (50 mg) by mouth 2 times a day. 1/2 tab 2 times a day 90 tablet 2    metoprolol tartrate (Lopressor) 25 mg tablet Take 1 tablet (25 mg) by mouth 2 times a day. 180 tablet 2    multivitamin tablet Take by mouth.      terazosin (Hytrin) 1 mg capsule Take 1 capsule (1 mg) by mouth once daily. 90 capsule 0    traMADol (Ultram) 50 mg tablet Take 1 tablet (50 mg) by mouth every 6 hours if needed for severe pain (7 - 10) (As Needed for Pain). 60 tablet 2    atorvastatin (Lipitor) 40 mg tablet Take 1 tablet (40 mg) by mouth once daily. As Directed (Patient not taking: Reported on 11/15/2024) 90 tablet 2    dapagliflozin propanediol (Farxiga) 10 mg Take 1 tablet (10 mg) by mouth once daily. For heart and kidney conditions (Patient not taking: Reported on 11/15/2024) 90 tablet 2     No current facility-administered medications on file prior to visit.       Allergies   Allergen Reactions    Bee Venom Protein (Honey Bee) Unknown         ROS: Denies chest pain, SOB, Headache, GI problems     Visit Vitals  /78   Pulse 80   Ht 1.778 m (5' 10\")   Wt 71.7 kg (158 lb)   SpO2 97%   BMI 22.67 kg/m²   Smoking Status Every Day   BSA 1.88 m²    " "  Vitals:    11/15/24 1130   BP: 136/78   Pulse: 80   SpO2: 97%   Weight: 71.7 kg (158 lb)   Height: 1.778 m (5' 10\")       PHYSICAL EXAM:  Alert and oriented x3.  Eyes: EOM grossly intact  Neck supple without lymph adenopathy or carotid bruit.  No masses or thyromegaly  Heart regular rate and rhythm without murmur.  Lungs clear to auscultation.  Legs without edema.  Gait is non-antalgic  Speech clear.  Hearing adequate.      The ASCVD Risk score (Francisco J BAUTISTA, et al., 2019) failed to calculate for the following reasons:    The 2019 ASCVD risk score is only valid for ages 40 to 79      DIAGNOSIS/PLAN:  1. Hypertension, unspecified type (Primary)  - Comprehensive Metabolic Panel; Future  - hydroCHLOROthiazide (HYDRODiuril) 25 mg tablet; Take 1 tablet (25 mg) by mouth once daily.  Dispense: 90 tablet; Refill: 2  - terazosin (Hytrin) 1 mg capsule; Take 1 capsule (1 mg) by mouth once daily.  Dispense: 90 capsule; Refill: 2    2. Medicare annual wellness visit, subsequent  Living Will / Advanced Care Planning: I spent more than 15 minutes discussing advance care planning including explanation and discussion of advanced directives.  If patient does not have current up-to-date documents, examples and information were provided on how to create both living will and power of .  Patient was encouraged to work on completing these documents.        3. Encounter for therapeutic drug level monitoring  - Opiate/Opioid/Benzo Prescription Compliance; Future    4. Encounter for drug screening  - Opiate/Opioid/Benzo Prescription Compliance; Future    5. Benign prostatic hyperplasia with incomplete bladder emptying  - alfuzosin (UroxatraL) 10 mg 24 hr tablet; Take 1 tablet (10 mg) by mouth once daily. For Prostate enlargement.  Do not crush, chew, or split.  Dispense: 90 tablet; Refill: 2    6. Hyperlipidemia, unspecified hyperlipidemia type  - atorvastatin (Lipitor) 40 mg tablet; Take 1 tablet (40 mg) by mouth once daily. As " Directed  Dispense: 90 tablet; Refill: 2    7. Anxiety  - FLUoxetine (PROzac) 20 mg capsule; Take 1 capsule (20 mg) by mouth once daily.  Dispense: 90 capsule; Refill: 2    8. Hypertension, essential  - losartan (Cozaar) 100 mg tablet; Take 0.5 tablets (50 mg) by mouth 2 times a day. 1/2 tab 2 times a day  Dispense: 90 tablet; Refill: 2  - metoprolol tartrate (Lopressor) 25 mg tablet; Take 1 tablet (25 mg) by mouth 2 times a day.  Dispense: 180 tablet; Refill: 2    9. Coronary artery disease involving native coronary artery of native heart without angina pectoris  - metoprolol tartrate (Lopressor) 25 mg tablet; Take 1 tablet (25 mg) by mouth 2 times a day.  Dispense: 180 tablet; Refill: 2    10. Osteoarthritis of spine with radiculopathy, lumbar region  - traMADol (Ultram) 50 mg tablet; Take 1 tablet (50 mg) by mouth every 6 hours if needed for severe pain (7 - 10) (As Needed for Pain).  Dispense: 60 tablet; Refill: 2        Return to office in 6 months for comprehensive medical evaluation, long-term medication use monitoring, and preventative services screening    We will continue to monitor, evaluate, assess and treat all problems/diagnoses as appropriate and continue to collaborate with specialists.    [facility]    Contact office or send a  [employer] message with any questions or concerns    Patient will only be notified of labs that require medical intervention.    Prescriptions will not be filled unless you are compliant with your follow up appointments or have a follow up appointment scheduled as per instruction of your physician. Refills should be requested at the time of your visit.    **Charting was completed using voice recognition technology and may include unintended errors**    Bassem Amador DO, FACOFP  [address] [id number]  [place] [address]  [phone]  Bassem Amador DO, MYCHALP

## 2024-12-30 ENCOUNTER — APPOINTMENT (OUTPATIENT)
Dept: PRIMARY CARE | Facility: CLINIC | Age: 84
End: 2024-12-30
Payer: MEDICARE

## 2025-01-24 ENCOUNTER — APPOINTMENT (OUTPATIENT)
Dept: DERMATOLOGY | Facility: CLINIC | Age: 85
End: 2025-01-24
Payer: MEDICARE

## 2025-01-24 DIAGNOSIS — L21.9 SEBORRHEIC DERMATITIS: Primary | ICD-10-CM

## 2025-01-24 PROCEDURE — 1159F MED LIST DOCD IN RCRD: CPT | Performed by: STUDENT IN AN ORGANIZED HEALTH CARE EDUCATION/TRAINING PROGRAM

## 2025-01-24 PROCEDURE — 99204 OFFICE O/P NEW MOD 45 MIN: CPT | Performed by: STUDENT IN AN ORGANIZED HEALTH CARE EDUCATION/TRAINING PROGRAM

## 2025-01-24 PROCEDURE — 1123F ACP DISCUSS/DSCN MKR DOCD: CPT | Performed by: STUDENT IN AN ORGANIZED HEALTH CARE EDUCATION/TRAINING PROGRAM

## 2025-01-24 RX ORDER — CLOBETASOL PROPIONATE 0.5 MG/ML
SOLUTION TOPICAL 2 TIMES DAILY
Qty: 50 ML | Refills: 11 | Status: SHIPPED | OUTPATIENT
Start: 2025-01-24

## 2025-01-24 RX ORDER — KETOCONAZOLE 20 MG/ML
SHAMPOO, SUSPENSION TOPICAL DAILY
Qty: 120 ML | Refills: 11 | Status: SHIPPED | OUTPATIENT
Start: 2025-01-24

## 2025-01-24 NOTE — PROGRESS NOTES
Subjective     Honorio Harvey is a 84 y.o. male who presents for the following: Hair/Scalp Problem (Dry/itchy scalp, mostly to right side, present for several months. Tried Head and Shoulders shampoo, no response. ).     Review of Systems:  No other skin or systemic complaints other than what is documented elsewhere in the note.    The following portions of the chart were reviewed this encounter and updated as appropriate:          Skin Cancer History  No skin cancer on file.      Specialty Problems    None       Objective   Well appearing patient in no apparent distress; mood and affect are within normal limits.    A focused skin examination was performed. All findings within normal limits unless otherwise noted below.    Assessment/Plan   1. Seborrheic dermatitis  Scalp  Well defined erythematous patch with overlying greasy scale.  Right parietal and occipital scalp and right ear    Favor sebopsoriasis    Discussed the chronic and relapsing nature of the condition. Counseled on relation to normal yeast species on skin and body's immune reaction to it. Discussed that goal is control, not cure, of condition.     Start ketoconazole 2% shampoo daily to affected area. Leave on 5 minutes before rinsing.   Start clobetasol 0.05% solution. Patient to apply to affected areas 2x daily x 2 weeks then 1 week off, repeat as needed. Side effects of topical steroids were reviewed including risk of skin atrophy.        ketoconazole (NIZOral) 2 % shampoo - Scalp  Apply topically once daily. Leave on scalp and ears 5 minutes before rinsing    clobetasol (Temovate) 0.05 % external solution - Scalp  Apply topically 2 times a day. To rash on scalp. 14 days on, 7 days off. Repeat as needed for rash.

## 2025-02-14 ENCOUNTER — APPOINTMENT (OUTPATIENT)
Dept: PRIMARY CARE | Facility: CLINIC | Age: 85
End: 2025-02-14
Payer: MEDICARE

## 2025-02-17 ENCOUNTER — APPOINTMENT (OUTPATIENT)
Dept: PRIMARY CARE | Facility: CLINIC | Age: 85
End: 2025-02-17
Payer: MEDICARE

## 2025-02-19 ENCOUNTER — TELEPHONE (OUTPATIENT)
Dept: PHARMACY | Facility: HOSPITAL | Age: 85
End: 2025-02-19
Payer: MEDICARE

## 2025-02-19 NOTE — TELEPHONE ENCOUNTER
Population Health: Outreach by Ambulatory Pharmacy Team    Patient: Honorio Harvey  Primary Care Provider (PCP): Bassem Amador DO  Payor: Kourtney BUTTERFIELD  Reason: Adherence  Medication(s): Losartan and Atorvastatin  Outcome: Patient Reached: Claims Adherence, states he has Losartan & Atorvastatin on hand; reports no other concerns with his medications. Did have questions on whether or not [facility] could fill his wife's meds still or not. [facility].     Cherie Jones, PharmD

## 2025-03-03 ENCOUNTER — OFFICE VISIT (OUTPATIENT)
Dept: PRIMARY CARE | Facility: CLINIC | Age: 85
End: 2025-03-03
Payer: MEDICARE

## 2025-03-03 VITALS
HEIGHT: 70 IN | SYSTOLIC BLOOD PRESSURE: 136 MMHG | BODY MASS INDEX: 22.33 KG/M2 | WEIGHT: 156 LBS | DIASTOLIC BLOOD PRESSURE: 76 MMHG

## 2025-03-03 DIAGNOSIS — M47.26 OSTEOARTHRITIS OF SPINE WITH RADICULOPATHY, LUMBAR REGION: Primary | ICD-10-CM

## 2025-03-03 DIAGNOSIS — Z51.81 ENCOUNTER FOR THERAPEUTIC DRUG LEVEL MONITORING: ICD-10-CM

## 2025-03-03 DIAGNOSIS — Z02.83 ENCOUNTER FOR DRUG SCREENING: ICD-10-CM

## 2025-03-03 PROCEDURE — 1159F MED LIST DOCD IN RCRD: CPT | Performed by: FAMILY MEDICINE

## 2025-03-03 PROCEDURE — 99213 OFFICE O/P EST LOW 20 MIN: CPT | Performed by: FAMILY MEDICINE

## 2025-03-03 PROCEDURE — 1123F ACP DISCUSS/DSCN MKR DOCD: CPT | Performed by: FAMILY MEDICINE

## 2025-03-03 PROCEDURE — 3078F DIAST BP <80 MM HG: CPT | Performed by: FAMILY MEDICINE

## 2025-03-03 PROCEDURE — 3075F SYST BP GE 130 - 139MM HG: CPT | Performed by: FAMILY MEDICINE

## 2025-03-03 RX ORDER — TRAMADOL HYDROCHLORIDE 50 MG/1
50 TABLET ORAL EVERY 6 HOURS PRN
Qty: 60 TABLET | Refills: 2 | Status: SHIPPED | OUTPATIENT
Start: 2025-03-03

## 2025-03-03 NOTE — PROGRESS NOTES
HPI 84 y.o. male presents for evaluation and refill of controlled substance.      Chronic lower back pain due to advanced osteoarthritis is managed with UlTRAM.  Last refill February 4.  60 tablets/month.  Last dose was this morning.  Denies side effects.  Able to remain active, drives an automobile and is the primary caregiver for his wife who has dementia.    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 condition    Occlusion and stenosis of bilateral carotid arteries 09/26/2016    Asymptomatic bilateral carotid artery stenosis    Occlusion and stenosis of left carotid artery 10/19/2020    Occlusion of carotid artery without cerebral infarction, left    Other conditions influencing health status 10/03/2016    Normal cardiac stress test    Other general symptoms and signs 12/23/2016    Excessive oral secretions    Parageusia 04/19/2020    Ageusia    Personal history of other mental and behavioral disorders 09/11/2019    History of anxiety    Personal history of other specified conditions 12/23/2016    History of dysphagia    Unspecified macular degeneration 10/03/2016    Macular degeneration      Past Surgical History:   Procedure Laterality Date    CAROTID ENDARTERECTOMY  04/19/2020    Carotid Thromboendarterectomy    OTHER SURGICAL HISTORY  12/27/2022    Lumbar laminectomy    TONSILLECTOMY  10/03/2016    Tonsillectomy     Family History   Problem Relation Name Age of Onset    Other (malignant neoplasm) Mother      Other (cerebral infarction) Father      Emphysema Father      Hypertension Father        Social History     Socioeconomic History     Marital status:      Spouse name: Not on file    Number of children: Not on file    Years of education: Not on file    Highest education level: Not on file   Occupational History    Not on file   Tobacco Use    Smoking status: Every Day     Current packs/day: 0.50     Average packs/day: 0.5 packs/day for 40.0 years (20.0 ttl pk-yrs)     Types: Cigarettes    Smokeless tobacco: Never   Substance and Sexual Activity    Alcohol use: Yes     Alcohol/week: 8.0 standard drinks of alcohol     Types: 7 Cans of beer, 1 Shots of liquor per week     Comment: Today indicates he has  4 drinks per week    Drug use: Yes     Comment: ULTRAM    Sexual activity: Not on file   Other Topics Concern    Not on file   Social History Narrative    Not on file     Social Drivers of Health     Financial Resource Strain: Not on file   Food Insecurity: Not on file   Transportation Needs: Not on file   Physical Activity: Not on file   Stress: Not on file   Social Connections: Not on file   Intimate Partner Violence: Not on file   Housing Stability: Not on file       Current Outpatient Medications on File Prior to Visit   Medication Sig Dispense Refill    acetaminophen (Tylenol) 325 mg tablet Take 2 tablets (650 mg) by mouth every 4 hours if needed for mild pain (1 - 3) or fever (temp greater than 38.0 C).      alfuzosin (UroxatraL) 10 mg 24 hr tablet Take 1 tablet (10 mg) by mouth once daily. For Prostate enlargement.  Do not crush, chew, or split. 90 tablet 2    aspirin 81 mg EC tablet Take 1 tablet (81 mg) by mouth once daily. As Directed      atorvastatin (Lipitor) 40 mg tablet Take 1 tablet (40 mg) by mouth once daily. As Directed 90 tablet 2    clobetasol (Temovate) 0.05 % external solution Apply topically 2 times a day. To rash on scalp. 14 days on, 7 days off. Repeat as needed for rash. 50 mL 11    EPINEPHrine 0.3 mg/0.3 mL injection syringe Inject 0.3 mL (0.3 mg) as directed if needed for anaphylaxis (Inject 0.3ml Intramusculary as  "Directed). Inject into upper leg. Call 911 after use.      fluocinolone (Synalar) 0.01 % external solution Apply topically 2 times a day. 60 mL 2    FLUoxetine (PROzac) 20 mg capsule Take 1 capsule (20 mg) by mouth once daily. 90 capsule 2    hydroCHLOROthiazide (HYDRODiuril) 25 mg tablet Take 1 tablet (25 mg) by mouth once daily. 90 tablet 2    ketoconazole (NIZOral) 2 % shampoo Apply topically once daily. Leave on scalp and ears 5 minutes before rinsing 120 mL 11    losartan (Cozaar) 100 mg tablet Take 0.5 tablets (50 mg) by mouth 2 times a day. 1/2 tab 2 times a day 90 tablet 2    metoprolol tartrate (Lopressor) 25 mg tablet Take 1 tablet (25 mg) by mouth 2 times a day. 180 tablet 2    multivitamin tablet Take by mouth.      terazosin (Hytrin) 1 mg capsule Take 1 capsule (1 mg) by mouth once daily. 90 capsule 2    traMADol (Ultram) 50 mg tablet Take 1 tablet (50 mg) by mouth every 6 hours if needed for severe pain (7 - 10) (As Needed for Pain). 60 tablet 2     No current facility-administered medications on file prior to visit.       Allergies   Allergen Reactions    Bee Venom Protein (Honey Bee) Unknown       Visit Vitals  /76   Ht 1.778 m (5' 10\")   Wt 70.8 kg (156 lb)   BMI 22.38 kg/m²   Smoking Status Every Day   BSA 1.87 m²        EXAM:  Alert and oriented ×3.  No acute distress.  No tremors noted.  Gait is normal.  Mood and affect are normal.     Assessment/Diagnosis  1. Osteoarthritis of spine with radiculopathy, lumbar region (Primary)  - traMADol (Ultram) 50 mg tablet; Take 1 tablet (50 mg) by mouth every 6 hours if needed for severe pain (7 - 10) (As Needed for Pain).  Dispense: 60 tablet; Refill: 2    2. Encounter for therapeutic drug level monitoring  - Opiate/Opioid/Benzo Prescription Compliance    3. Encounter for drug screening  - Opiate/Opioid/Benzo Prescription Compliance        Plan    [place] reviewed.  Controlled Substance Agreement is current.  Urine drug screen up to date.    CONTROLLED " SUBSTANCE USE:   Patient is aware of Dr. Amador's and Dedra Nassar's practice rules for use of scheduled medication.  Has a signed contract stating that patient will only receive controlled substance prescriptions from Dr. Amador, will only receive a one month supply, will fill prescriptions at one pharmacy, and agrees to a random urine drug screen.  Patient is aware that she must have an office appointment every 90 days to continue to receive benzodiazepines or narcotics.        Follow up in 3 months for medical management    I will continue to monitor, evaluate, assess and treat all problems/diagnoses as appropriate and continue to collaborate with specialists.    Contact office or send a  [facility] [facility] message with any questions or concerns    Patient will only be notified of labs that require medical intervention.    Prescriptions will not be filled unless you are compliant with your follow up appointments or have a follow up appointment scheduled as per instruction of your physician. Refills should be requested at the time of your visit.    **Charting was completed using voice recognition technology and may include unintended errors**    Bassem Amador DO, FACOFP  Senior Attending Physician  [facility]  [address] [facility] [id number]  [place] [address] [phone]

## 2025-05-19 ENCOUNTER — APPOINTMENT (OUTPATIENT)
Dept: PRIMARY CARE | Facility: CLINIC | Age: 85
End: 2025-05-19
Payer: MEDICARE

## 2025-05-19 VITALS
HEIGHT: 70 IN | BODY MASS INDEX: 21.76 KG/M2 | SYSTOLIC BLOOD PRESSURE: 85 MMHG | OXYGEN SATURATION: 95 % | DIASTOLIC BLOOD PRESSURE: 59 MMHG | WEIGHT: 152 LBS | HEART RATE: 56 BPM

## 2025-05-19 DIAGNOSIS — E29.1 HYPOGONADISM MALE: ICD-10-CM

## 2025-05-19 DIAGNOSIS — R06.02 SHORTNESS OF BREATH: ICD-10-CM

## 2025-05-19 DIAGNOSIS — N40.0 BENIGN PROSTATIC HYPERPLASIA, UNSPECIFIED WHETHER LOWER URINARY TRACT SYMPTOMS PRESENT: ICD-10-CM

## 2025-05-19 DIAGNOSIS — N40.1 BENIGN PROSTATIC HYPERPLASIA WITH INCOMPLETE BLADDER EMPTYING: ICD-10-CM

## 2025-05-19 DIAGNOSIS — N18.31 STAGE 3A CHRONIC KIDNEY DISEASE (MULTI): ICD-10-CM

## 2025-05-19 DIAGNOSIS — F41.9 ANXIETY: ICD-10-CM

## 2025-05-19 DIAGNOSIS — Z02.83 ENCOUNTER FOR DRUG SCREENING: ICD-10-CM

## 2025-05-19 DIAGNOSIS — E55.9 VITAMIN D DEFICIENCY: ICD-10-CM

## 2025-05-19 DIAGNOSIS — Z00.00 HEALTH CARE MAINTENANCE: Primary | ICD-10-CM

## 2025-05-19 DIAGNOSIS — Z51.81 ENCOUNTER FOR THERAPEUTIC DRUG LEVEL MONITORING: ICD-10-CM

## 2025-05-19 DIAGNOSIS — E78.5 HYPERLIPIDEMIA, UNSPECIFIED HYPERLIPIDEMIA TYPE: ICD-10-CM

## 2025-05-19 DIAGNOSIS — R39.14 BENIGN PROSTATIC HYPERPLASIA WITH INCOMPLETE BLADDER EMPTYING: ICD-10-CM

## 2025-05-19 DIAGNOSIS — I25.10 CORONARY ARTERY DISEASE INVOLVING NATIVE CORONARY ARTERY OF NATIVE HEART WITHOUT ANGINA PECTORIS: ICD-10-CM

## 2025-05-19 DIAGNOSIS — R39.198 SLOWING OF URINARY STREAM: ICD-10-CM

## 2025-05-19 DIAGNOSIS — F17.200 TOBACCO USE DISORDER: ICD-10-CM

## 2025-05-19 DIAGNOSIS — J43.9 PULMONARY EMPHYSEMA, UNSPECIFIED EMPHYSEMA TYPE (MULTI): ICD-10-CM

## 2025-05-19 DIAGNOSIS — I10 HYPERTENSION, ESSENTIAL: ICD-10-CM

## 2025-05-19 DIAGNOSIS — M47.26 OSTEOARTHRITIS OF SPINE WITH RADICULOPATHY, LUMBAR REGION: ICD-10-CM

## 2025-05-19 PROCEDURE — 1159F MED LIST DOCD IN RCRD: CPT | Performed by: FAMILY MEDICINE

## 2025-05-19 PROCEDURE — 93000 ELECTROCARDIOGRAM COMPLETE: CPT | Performed by: FAMILY MEDICINE

## 2025-05-19 PROCEDURE — 3074F SYST BP LT 130 MM HG: CPT | Performed by: FAMILY MEDICINE

## 2025-05-19 PROCEDURE — 99397 PER PM REEVAL EST PAT 65+ YR: CPT | Performed by: FAMILY MEDICINE

## 2025-05-19 PROCEDURE — 3078F DIAST BP <80 MM HG: CPT | Performed by: FAMILY MEDICINE

## 2025-05-19 PROCEDURE — 99213 OFFICE O/P EST LOW 20 MIN: CPT | Performed by: FAMILY MEDICINE

## 2025-05-19 PROCEDURE — 1160F RVW MEDS BY RX/DR IN RCRD: CPT | Performed by: FAMILY MEDICINE

## 2025-05-19 RX ORDER — TRAMADOL HYDROCHLORIDE 50 MG/1
50 TABLET, FILM COATED ORAL EVERY 6 HOURS PRN
Qty: 60 TABLET | Refills: 2 | Status: SHIPPED | OUTPATIENT
Start: 2025-05-19

## 2025-05-19 RX ORDER — FLUOXETINE 20 MG/1
20 CAPSULE ORAL DAILY
Qty: 90 CAPSULE | Refills: 2 | Status: SHIPPED | OUTPATIENT
Start: 2025-05-19

## 2025-05-19 RX ORDER — TERAZOSIN 1 MG/1
1 CAPSULE ORAL DAILY
Qty: 90 CAPSULE | Refills: 2 | Status: SHIPPED | OUTPATIENT
Start: 2025-05-19 | End: 2026-02-13

## 2025-05-19 RX ORDER — ALFUZOSIN HYDROCHLORIDE 10 MG/1
10 TABLET, EXTENDED RELEASE ORAL DAILY
Qty: 90 TABLET | Refills: 2 | Status: SHIPPED | OUTPATIENT
Start: 2025-05-19 | End: 2026-02-13

## 2025-05-19 RX ORDER — ATORVASTATIN CALCIUM 40 MG/1
40 TABLET, FILM COATED ORAL DAILY
Qty: 90 TABLET | Refills: 2 | Status: SHIPPED | OUTPATIENT
Start: 2025-05-19

## 2025-05-19 RX ORDER — LOSARTAN POTASSIUM 100 MG/1
50 TABLET ORAL 2 TIMES DAILY
Qty: 90 TABLET | Refills: 2 | Status: SHIPPED | OUTPATIENT
Start: 2025-05-19

## 2025-05-19 RX ORDER — METOPROLOL TARTRATE 25 MG/1
25 TABLET, FILM COATED ORAL 2 TIMES DAILY
Qty: 180 TABLET | Refills: 2 | Status: SHIPPED | OUTPATIENT
Start: 2025-05-19

## 2025-05-19 NOTE — PROGRESS NOTES
Annual Comprehensive Medical Exam    84 y.o. male presents for annual comprehensive medical evaluation and preventive services screening.  No recent hospitalizations, surgeries or significant injuries.    HPI  Concerned with BP being high at home.  170 systolic this morning.  Doesn't feel good.  Under lot of stress due to having to put wife in a nursing home.  Hasn't been taking hydrochlorothiazide due to frequent urination    Having left posterior leg pain    Intermittent numbness in thumb, right index and middle fingers.  Usually occurs during sleep.  Needs a new pillow.      Lower back pain persists when standing.  No pain while supine.  Associated  with left leg pain noted above.  Using 1-2 Ultram daily.  Doing HEP.  Takes 2 ASA mg per day.    Last refill of Ultram was 4/26/25    Anxiety - not taking Prozac daily.   Using it prn.      Medical History[1]   Surgical History[2]  Family History[3]   Social History     Socioeconomic History    Marital status:      Spouse name: Not on file    Number of children: Not on file    Years of education: Not on file    Highest education level: Not on file   Occupational History    Not on file   Tobacco Use    Smoking status: Every Day     Current packs/day: 0.50     Average packs/day: 0.5 packs/day for 40.0 years (20.0 ttl pk-yrs)     Types: Cigarettes    Smokeless tobacco: Never   Substance and Sexual Activity    Alcohol use: Yes     Alcohol/week: 8.0 standard drinks of alcohol     Types: 7 Cans of beer, 1 Shots of liquor per week     Comment: Today indicates he has  4 drinks per week    Drug use: Yes     Comment: ULTRAM    Sexual activity: Not on file   Other Topics Concern    Not on file   Social History Narrative    Not on file     Social Drivers of Health     Financial Resource Strain: Not on file   Food Insecurity: Not on file   Transportation Needs: Not on file   Physical Activity: Not on file   Stress: Not on file   Social Connections: Not on file   Intimate  "Partner Violence: Not on file   Housing Stability: Not on file       Medications Ordered Prior to Encounter[4]    Allergies[5]      Review of Systems:  Complete review of systems is negative today except for that mentioned in the history of present illness.  In particular patient denies chest pain, shortness of breath, headaches and GI disturbances.      Visit Vitals  BP 85/59   Pulse 56   Ht 1.778 m (5' 10\")   Wt 68.9 kg (152 lb)   SpO2 95%   BMI 21.81 kg/m²   Smoking Status Every Day   BSA 1.84 m²      Vitals:    05/19/25 1325 05/19/25 1407   BP: 82/51 85/59   Pulse: 56    SpO2: 95%    Weight: 68.9 kg (152 lb)    Height: 1.778 m (5' 10\")      Physical Exam  Gen: Alert and oriented ×3 male in no acute distress.  HEENT: Head is normocephalic.  Extraocular muscles are intact.  Tympanic membranes are clear.  Pharynx is clear.  Neck is supple without adenopathy or carotid bruits.  No masses or thyromegaly  Heart: Regular rate and rhythm without murmurs.  Lungs: Clear to auscultation bilaterally.  Abdomen: Soft with normal bowel sounds.  No masses or pain to palpation.  No bruits auscultated.  Extremities: Good range of motion of all joints.  No significant edema. Pedal pulses +1-2/4  Neuro: No signs of focal neurologic deficit.  No tremor.  Speech and hearing are normal.  DTRs +3/4;  Muscle Strength +5/5.  Musculoskeletal: Spine with good ROM.  No scoliosis.  Leg lengths are equal.  Skin: No significant or irregular nevi visualized.  Psych: normal affect.  No suicidal ideation.  Good judgement and insight.       DIAGNOSIS/PLAN    1. Health care maintenance (Primary)  Increase fluids to 75 ounces daily.    2. Hypertension, essential  -Decrease Cozaar to 1/2 tablet once daily.  Check blood pressure once or twice daily and notify me in 1 week.  - Bring home blood pressure device to every office appointment.    - Comprehensive Metabolic Panel; Future  - Comprehensive Metabolic Panel  - Lipid Panel  - losartan (Cozaar) 100 " mg tablet; Take 0.5 tablets (50 mg) by mouth 2 times a day. 1/2 tab 2 times a day  Dispense: 90 tablet; Refill: 2  - metoprolol tartrate (Lopressor) 25 mg tablet; Take 1 tablet (25 mg) by mouth 2 times a day.  Dispense: 180 tablet; Refill: 2  - terazosin (Hytrin) 1 mg capsule; Take 1 capsule (1 mg) by mouth once daily.  Dispense: 90 capsule; Refill: 2    3. Benign prostatic hyperplasia, unspecified whether lower urinary tract symptoms present  - Prostate Specific Antigen  - Urinalysis with Reflex Microscopic    4. Pulmonary emphysema, unspecified emphysema type (Multi)  Recommended not smoking cigarettes.  If becomes short of breath with activity or rest, prophylactic inhalers are available    5. Anxiety  -Reiterated that Prozac is only effective if taken daily.  Better compliance will likely help him deal with the stress he is under with his wife.  - FLUoxetine (PROzac) 20 mg capsule; Take 1 capsule (20 mg) by mouth once daily.  Dispense: 90 capsule; Refill: 2    6. Tobacco use disorder  Tobacco use: Patient encouraged to stop smoking.  The patient is aware of the detrimental effects of long-term smoking on overall health and life expectancy.  Call [phone] for additional stop smoking counselling free of charge.    Over the past several years, I have counseled the patient about smoking/tobacco cessation and how I can support efforts when patient is ready to quit.  I have discussed nicotine replacement therapy, Chantix, Wellbutrin, hypnosis, support groups and acupuncture as potential options.  Patient currently has no signs or symptoms of tobacco related disease.  If interested in hypnotism, ask for referral to [facility] or call [facility] in [place] at [phone]    7. Stage 3a chronic kidney disease (Multi)    8. Osteoarthritis of spine with radiculopathy, lumbar region  - traMADol (Ultram) 50 mg tablet; Take 1 tablet (50 mg) by mouth every 6 hours if needed for severe  pain (7 - 10) (As Needed for Pain).  Dispense: 60 tablet; Refill: 2  - Increase fluids to at least 75 ounces daily  - For 1 week take aspirin 325 mg 3 tablets 3 times daily with food  - For additional pain relief, take Tylenol 1000 mg up to 3 times daily    9. Hyperlipidemia, unspecified hyperlipidemia type  - atorvastatin (Lipitor) 40 mg tablet; Take 1 tablet (40 mg) by mouth once daily. As Directed  Dispense: 90 tablet; Refill: 2    10. Benign prostatic hyperplasia with incomplete bladder emptying  - alfuzosin (UroxatraL) 10 mg 24 hr tablet; Take 1 tablet (10 mg) by mouth once daily. For Prostate enlargement.  Do not crush, chew, or split.  Dispense: 90 tablet; Refill: 2    11. Vitamin D deficiency  - CBC  - Vitamin D 25-Hydroxy,Total (for eval of Vitamin D levels)    12. Coronary artery disease involving native coronary artery of native heart without angina pectoris  - metoprolol tartrate (Lopressor) 25 mg tablet; Take 1 tablet (25 mg) by mouth 2 times a day.  Dispense: 180 tablet; Refill: 2    13. Slowing of urinary stream  - Prostate Specific Antigen  - Urinalysis with Reflex Microscopic    14. Shortness of breath  - ECG 12 Lead    15. Hypogonadism male  - TSH with reflex to Free T4 if abnormal  - Testosterone    16. Encounter for drug screening  - Drug Screen, Urine With Reflex to Confirmation  - Tramadol Confirmation, Urine    17. Encounter for therapeutic drug level monitoring  - Drug Screen, Urine With Reflex to Confirmation  - Tramadol Confirmation, Urine        Follow up in 6 months for medical management    I will continue to monitor, evaluate, assess and treat all problems/diagnoses as appropriate and continue to collaborate with specialists.    Contact office or send a  MY Chart message with any questions or concerns    Encouraged to sign up with my  My Chart  Patient will only be notified of labs that require medical intervention.    Prescriptions will not be filled unless you are compliant with  your follow up appointments or have a follow up appointment scheduled as per instruction of your physician. Refills should be requested at the time of your visit.    **Charting was completed using voice recognition technology and may include unintended errors**    Bassem Amador DO, KIRK  [id number], [id number]  [place] [address]  [phone]  Bassem Amador DO, FACOFP           [1]   Past Medical History:  Diagnosis Date    Encounter for surgical aftercare following surgery on the circulatory system 01/18/2018    Postop carotid endarterectomy surveillance, encounter for    Encounter for surgical aftercare following surgery on the circulatory system 11/22/2016    Postop carotid endarterectomy surveillance, encounter for    Insomnia due to medical condition 10/03/2016    Insomnia due to medical condition    Occlusion and stenosis of bilateral carotid arteries 09/26/2016    Asymptomatic bilateral carotid artery stenosis    Occlusion and stenosis of left carotid artery 10/19/2020    Occlusion of carotid artery without cerebral infarction, left    Other conditions influencing health status 10/03/2016    Normal cardiac stress test    Other general symptoms and signs 12/23/2016    Excessive oral secretions    Parageusia 04/19/2020    Ageusia    Personal history of other mental and behavioral disorders 09/11/2019    History of anxiety    Personal history of other specified conditions 12/23/2016    History of dysphagia    Unspecified macular degeneration 10/03/2016    Macular degeneration   [2]   Past Surgical History:  Procedure Laterality Date    CAROTID ENDARTERECTOMY  04/19/2020    Carotid Thromboendarterectomy    OTHER SURGICAL HISTORY  12/27/2022    Lumbar laminectomy    TONSILLECTOMY  10/03/2016    Tonsillectomy   [3]   Family History  Problem Relation Name Age of Onset    Other (malignant neoplasm) Mother      Other (cerebral infarction) Father      Emphysema Father      Hypertension Father     [4]   Current  Outpatient Medications on File Prior to Visit   Medication Sig Dispense Refill    aspirin 81 mg EC tablet Take 1 tablet (81 mg) by mouth once daily. As Directed      EPINEPHrine 0.3 mg/0.3 mL injection syringe Inject 0.3 mL (0.3 mg) as directed if needed for anaphylaxis (Inject 0.3ml Intramusculary as Directed). Inject into upper leg. Call 911 after use.      ketoconazole (NIZOral) 2 % shampoo Apply topically once daily. Leave on scalp and ears 5 minutes before rinsing 120 mL 11    multivitamin tablet Take by mouth.      [DISCONTINUED] alfuzosin (UroxatraL) 10 mg 24 hr tablet Take 1 tablet (10 mg) by mouth once daily. For Prostate enlargement.  Do not crush, chew, or split. 90 tablet 2    [DISCONTINUED] atorvastatin (Lipitor) 40 mg tablet Take 1 tablet (40 mg) by mouth once daily. As Directed 90 tablet 2    [DISCONTINUED] clobetasol (Temovate) 0.05 % external solution Apply topically 2 times a day. To rash on scalp. 14 days on, 7 days off. Repeat as needed for rash. 50 mL 11    [DISCONTINUED] fluocinolone (Synalar) 0.01 % external solution Apply topically 2 times a day. 60 mL 2    [DISCONTINUED] FLUoxetine (PROzac) 20 mg capsule Take 1 capsule (20 mg) by mouth once daily. 90 capsule 2    [DISCONTINUED] losartan (Cozaar) 100 mg tablet Take 0.5 tablets (50 mg) by mouth 2 times a day. 1/2 tab 2 times a day 90 tablet 2    [DISCONTINUED] metoprolol tartrate (Lopressor) 25 mg tablet Take 1 tablet (25 mg) by mouth 2 times a day. 180 tablet 2    [DISCONTINUED] terazosin (Hytrin) 1 mg capsule Take 1 capsule (1 mg) by mouth once daily. 90 capsule 2    [DISCONTINUED] traMADol (Ultram) 50 mg tablet Take 1 tablet (50 mg) by mouth every 6 hours if needed for severe pain (7 - 10) (As Needed for Pain). 60 tablet 2    acetaminophen (Tylenol) 325 mg tablet Take 2 tablets (650 mg) by mouth every 4 hours if needed for mild pain (1 - 3) or fever (temp greater than 38.0 C).      [DISCONTINUED] hydroCHLOROthiazide (HYDRODiuril) 25 mg  tablet Take 1 tablet (25 mg) by mouth once daily. (Patient not taking: Reported on 5/19/2025) 90 tablet 2     No current facility-administered medications on file prior to visit.   [5]   Allergies  Allergen Reactions    Bee Venom Protein (Honey Bee) Unknown

## 2025-05-19 NOTE — PATIENT INSTRUCTIONS
For low back pain and inflammation, take Aspirin 325 mg, 3 pills 3x/day for 1 wk.  - also add Tylenol 1000 mg up to 3 x/day.    Water 75 oz/day.      BP - stop morning losartan.  Check BP daily.      Take Prozac every day for stress

## 2025-05-20 LAB
ALBUMIN SERPL-MCNC: 4.7 G/DL (ref 3.6–5.1)
ALP SERPL-CCNC: 77 U/L (ref 35–144)
ALT SERPL-CCNC: 15 U/L (ref 9–46)
ANION GAP SERPL CALCULATED.4IONS-SCNC: 10 MMOL/L (CALC) (ref 7–17)
AST SERPL-CCNC: 20 U/L (ref 10–35)
BILIRUB SERPL-MCNC: 0.6 MG/DL (ref 0.2–1.2)
BUN SERPL-MCNC: 40 MG/DL (ref 7–25)
CALCIUM SERPL-MCNC: 10 MG/DL (ref 8.6–10.3)
CHLORIDE SERPL-SCNC: 102 MMOL/L (ref 98–110)
CO2 SERPL-SCNC: 26 MMOL/L (ref 20–32)
CREAT SERPL-MCNC: 2.03 MG/DL (ref 0.7–1.22)
EGFRCR SERPLBLD CKD-EPI 2021: 32 ML/MIN/1.73M2
GLUCOSE SERPL-MCNC: 114 MG/DL (ref 65–99)
POTASSIUM SERPL-SCNC: 4.8 MMOL/L (ref 3.5–5.3)
PROT SERPL-MCNC: 7.3 G/DL (ref 6.1–8.1)
SODIUM SERPL-SCNC: 138 MMOL/L (ref 135–146)

## 2025-05-22 LAB
25(OH)D3+25(OH)D2 SERPL-MCNC: 36 NG/ML (ref 30–100)
AMPHETAMINES UR QL: NEGATIVE NG/ML
BARBITURATES UR QL: NEGATIVE NG/ML
BENZODIAZ UR QL: NEGATIVE NG/ML
BZE UR QL: NEGATIVE NG/ML
CHOLEST SERPL-MCNC: 199 MG/DL
CHOLEST/HDLC SERPL: 2.5 (CALC)
CREAT UR-MCNC: 297.6 MG/DL
DRUG SCREEN COMMENT UR-IMP: ABNORMAL
ERYTHROCYTE [DISTWIDTH] IN BLOOD BY AUTOMATED COUNT: 12.8 % (ref 11–15)
FENTANYL UR QL SCN: NEGATIVE NG/ML
HCT VFR BLD AUTO: 44.6 % (ref 38.5–50)
HDLC SERPL-MCNC: 81 MG/DL
HGB BLD-MCNC: 14.4 G/DL (ref 13.2–17.1)
LDLC SERPL CALC-MCNC: 103 MG/DL (CALC)
MCH RBC QN AUTO: 30.5 PG (ref 27–33)
MCHC RBC AUTO-ENTMCNC: 32.3 G/DL (ref 32–36)
MCV RBC AUTO: 94.5 FL (ref 80–100)
METHADONE UR QL: NEGATIVE NG/ML
NONHDLC SERPL-MCNC: 118 MG/DL (CALC)
NORTRAMADOL UR-MCNC: 6031 NG/ML
OPIATES UR QL: NEGATIVE NG/ML
OXIDANTS UR QL: NEGATIVE MCG/ML
OXYCODONE UR QL: NEGATIVE NG/ML
PCP UR QL: NEGATIVE NG/ML
PH UR: 5.4 [PH] (ref 4.5–9)
PLATELET # BLD AUTO: 214 THOUSAND/UL (ref 140–400)
PMV BLD REES-ECKER: 10.1 FL (ref 7.5–12.5)
PSA SERPL-MCNC: 6.66 NG/ML
QUEST NOTES AND COMMENTS: ABNORMAL
RBC # BLD AUTO: 4.72 MILLION/UL (ref 4.2–5.8)
TESTOST SERPL-MCNC: 222 NG/DL (ref 250–827)
THC UR QL: NEGATIVE NG/ML
TRAMADOL UR-MCNC: ABNORMAL NG/ML
TRIGL SERPL-MCNC: 66 MG/DL
TSH SERPL-ACNC: 1.66 MIU/L (ref 0.4–4.5)
WBC # BLD AUTO: 6.7 THOUSAND/UL (ref 3.8–10.8)

## 2025-05-25 DIAGNOSIS — N18.32 STAGE 3B CHRONIC KIDNEY DISEASE (MULTI): ICD-10-CM

## 2025-05-25 DIAGNOSIS — N18.31 STAGE 3A CHRONIC KIDNEY DISEASE (MULTI): Primary | ICD-10-CM

## 2025-06-01 DIAGNOSIS — N18.32 STAGE 3B CHRONIC KIDNEY DISEASE (MULTI): ICD-10-CM

## 2025-06-06 ENCOUNTER — TELEPHONE (OUTPATIENT)
Dept: PRIMARY CARE | Facility: CLINIC | Age: 85
End: 2025-06-06
Payer: MEDICARE

## 2025-08-18 ENCOUNTER — APPOINTMENT (OUTPATIENT)
Dept: PRIMARY CARE | Facility: CLINIC | Age: 85
End: 2025-08-18
Payer: MEDICARE

## 2025-08-18 VITALS
HEIGHT: 70 IN | HEART RATE: 66 BPM | DIASTOLIC BLOOD PRESSURE: 71 MMHG | SYSTOLIC BLOOD PRESSURE: 124 MMHG | OXYGEN SATURATION: 98 % | BODY MASS INDEX: 21.45 KG/M2 | WEIGHT: 149.8 LBS

## 2025-08-18 DIAGNOSIS — M47.26 OSTEOARTHRITIS OF SPINE WITH RADICULOPATHY, LUMBAR REGION: ICD-10-CM

## 2025-08-18 PROCEDURE — 3074F SYST BP LT 130 MM HG: CPT | Performed by: FAMILY MEDICINE

## 2025-08-18 PROCEDURE — 1159F MED LIST DOCD IN RCRD: CPT | Performed by: FAMILY MEDICINE

## 2025-08-18 PROCEDURE — 3078F DIAST BP <80 MM HG: CPT | Performed by: FAMILY MEDICINE

## 2025-08-18 PROCEDURE — 99213 OFFICE O/P EST LOW 20 MIN: CPT | Performed by: FAMILY MEDICINE

## 2025-08-18 RX ORDER — TRAMADOL HYDROCHLORIDE 50 MG/1
50 TABLET, FILM COATED ORAL EVERY 6 HOURS PRN
Qty: 60 TABLET | Refills: 2 | Status: SHIPPED | OUTPATIENT
Start: 2025-08-30

## 2025-11-14 ENCOUNTER — APPOINTMENT (OUTPATIENT)
Dept: VASCULAR SURGERY | Facility: CLINIC | Age: 85
End: 2025-11-14
Payer: MEDICARE

## 2025-11-17 ENCOUNTER — APPOINTMENT (OUTPATIENT)
Dept: PRIMARY CARE | Facility: CLINIC | Age: 85
End: 2025-11-17
Payer: MEDICARE